# Patient Record
Sex: FEMALE | Race: WHITE | NOT HISPANIC OR LATINO | Employment: UNEMPLOYED | URBAN - METROPOLITAN AREA
[De-identification: names, ages, dates, MRNs, and addresses within clinical notes are randomized per-mention and may not be internally consistent; named-entity substitution may affect disease eponyms.]

---

## 2023-10-16 ENCOUNTER — OFFICE VISIT (OUTPATIENT)
Dept: OPHTHALMOLOGY | Facility: CLINIC | Age: 43
End: 2023-10-16
Payer: COMMERCIAL

## 2023-10-16 DIAGNOSIS — H10.13 ALLERGIC CONJUNCTIVITIS OF BOTH EYES: Primary | ICD-10-CM

## 2023-10-16 PROCEDURE — 99202 OFFICE O/P NEW SF 15 MIN: CPT | Performed by: OPHTHALMOLOGY

## 2023-10-16 ASSESSMENT — TONOMETRY
IOP_METHOD: TONOPEN
OS_IOP_MMHG: 16
OD_IOP_MMHG: 14

## 2023-10-16 ASSESSMENT — SLIT LAMP EXAM - LIDS
COMMENTS: NORMAL
COMMENTS: NORMAL

## 2023-10-16 ASSESSMENT — CUP TO DISC RATIO
OD_RATIO: 0.2
OS_RATIO: 0.2

## 2023-10-16 ASSESSMENT — EXTERNAL EXAM - RIGHT EYE: OD_EXAM: NORMAL

## 2023-10-16 ASSESSMENT — VISUAL ACUITY
CORRECTION_TYPE: GLASSES
METHOD: SNELLEN - LINEAR
OD_CC: 20/20
OS_CC: 20/20

## 2023-10-16 ASSESSMENT — EXTERNAL EXAM - LEFT EYE: OS_EXAM: NORMAL

## 2023-10-16 NOTE — PROGRESS NOTES
Assessment/Plan   Diagnoses and all orders for this visit:  Allergic conjunctivitis of both eyes  Resume pataday  Can use tobradex pulse for breakthru symptoms  Decrease contact lens (CL) wearing time

## 2023-10-30 ENCOUNTER — PREP FOR PROCEDURE (OUTPATIENT)
Dept: ORTHOPEDIC SURGERY | Facility: HOSPITAL | Age: 43
End: 2023-10-30
Payer: COMMERCIAL

## 2023-10-30 DIAGNOSIS — M24.676 ANKYLOSIS OF SUBTALAR JOINT: Primary | ICD-10-CM

## 2023-10-30 DIAGNOSIS — M25.872 ANKLE IMPINGEMENT SYNDROME, LEFT: ICD-10-CM

## 2023-10-30 DIAGNOSIS — M76.72 PERONEAL TENDINITIS OF LEFT LOWER EXTREMITY: ICD-10-CM

## 2023-10-30 DIAGNOSIS — M95.8 OSTEOCHONDRAL DEFECT OF TALUS: ICD-10-CM

## 2023-10-31 PROBLEM — M24.676: Status: ACTIVE | Noted: 2023-10-30

## 2023-10-31 PROBLEM — M25.872 ANKLE IMPINGEMENT SYNDROME, LEFT: Status: ACTIVE | Noted: 2023-10-30

## 2023-10-31 PROBLEM — M95.8 OSTEOCHONDRAL DEFECT OF TALUS: Status: ACTIVE | Noted: 2023-10-30

## 2023-10-31 PROBLEM — M76.72 PERONEAL TENDINITIS OF LEFT LOWER EXTREMITY: Status: ACTIVE | Noted: 2023-10-30

## 2023-10-31 RX ORDER — SODIUM CHLORIDE 9 MG/ML
100 INJECTION, SOLUTION INTRAVENOUS CONTINUOUS
Status: CANCELLED | OUTPATIENT
Start: 2023-11-16

## 2023-11-03 PROBLEM — J32.2 CHRONIC POSTERIOR ETHMOIDAL SINUSITIS: Status: ACTIVE | Noted: 2023-11-03

## 2023-11-03 PROBLEM — J34.2 DNS (DEVIATED NASAL SEPTUM): Status: ACTIVE | Noted: 2023-11-03

## 2023-11-03 PROBLEM — G57.61 MORTON'S NEUROMA OF RIGHT FOOT: Status: ACTIVE | Noted: 2023-11-03

## 2023-11-03 PROBLEM — M20.11 HALLUX VALGUS WITH BUNIONS OF RIGHT FOOT: Status: ACTIVE | Noted: 2023-11-03

## 2023-11-03 PROBLEM — M21.611 HALLUX VALGUS WITH BUNIONS OF RIGHT FOOT: Status: ACTIVE | Noted: 2023-11-03

## 2023-11-03 PROBLEM — H69.91 DYSFUNCTION OF RIGHT EUSTACHIAN TUBE: Status: ACTIVE | Noted: 2023-11-03

## 2023-11-03 PROBLEM — F41.9 ANXIETY: Status: ACTIVE | Noted: 2021-08-11

## 2023-11-03 RX ORDER — LEVALBUTEROL 1.25 MG/.5ML
SOLUTION, CONCENTRATE RESPIRATORY (INHALATION)
COMMUNITY
Start: 2017-11-30

## 2023-11-03 RX ORDER — MAGNESIUM 250 MG
250 TABLET ORAL
COMMUNITY

## 2023-11-03 RX ORDER — DULOXETINE 40 MG/1
50 CAPSULE, DELAYED RELEASE ORAL DAILY
COMMUNITY
Start: 2023-10-04

## 2023-11-03 RX ORDER — PREDNISONE 20 MG/1
1 TABLET ORAL 2 TIMES DAILY
Status: ON HOLD | COMMUNITY
Start: 2017-11-30 | End: 2023-11-16 | Stop reason: ALTCHOICE

## 2023-11-03 RX ORDER — LEVOTHYROXINE SODIUM 50 UG/1
50 TABLET ORAL
COMMUNITY
Start: 2017-03-22

## 2023-11-03 RX ORDER — LEVALBUTEROL TARTRATE 45 UG/1
2 AEROSOL, METERED ORAL EVERY 4 HOURS PRN
COMMUNITY
Start: 2017-11-29

## 2023-11-03 RX ORDER — TALC
3 POWDER (GRAM) TOPICAL NIGHTLY
COMMUNITY

## 2023-11-03 RX ORDER — CLOBETASOL PROPIONATE 0.5 MG/G
CREAM TOPICAL
COMMUNITY
Start: 2023-10-16

## 2023-11-03 RX ORDER — ALBUTEROL SULFATE 90 UG/1
2 AEROSOL, METERED RESPIRATORY (INHALATION) EVERY 6 HOURS PRN
Status: ON HOLD | COMMUNITY
End: 2023-11-16 | Stop reason: ALTCHOICE

## 2023-11-03 RX ORDER — MOMETASONE FUROATE 220 UG/1
1 INHALANT RESPIRATORY (INHALATION)
Status: ON HOLD | COMMUNITY
Start: 2019-02-26 | End: 2023-11-16 | Stop reason: ALTCHOICE

## 2023-11-03 RX ORDER — SILVER SULFADIAZINE 10 G/1000G
CREAM TOPICAL
Status: ON HOLD | COMMUNITY
Start: 2018-01-27 | End: 2023-11-16 | Stop reason: ALTCHOICE

## 2023-11-03 RX ORDER — FLUNISOLIDE 0.25 MG/ML
2 SOLUTION NASAL 2 TIMES DAILY
COMMUNITY
Start: 2018-03-06

## 2023-11-03 RX ORDER — ALPRAZOLAM 0.25 MG/1
0.25 TABLET ORAL
COMMUNITY

## 2023-11-06 ENCOUNTER — OFFICE VISIT (OUTPATIENT)
Dept: ORTHOPEDIC SURGERY | Facility: HOSPITAL | Age: 43
End: 2023-11-06
Payer: COMMERCIAL

## 2023-11-06 DIAGNOSIS — M25.872 ANKLE IMPINGEMENT SYNDROME, LEFT: Primary | ICD-10-CM

## 2023-11-06 DIAGNOSIS — M24.676 ANKYLOSIS OF SUBTALAR JOINT: ICD-10-CM

## 2023-11-06 DIAGNOSIS — M95.8 OSTEOCHONDRAL DEFECT OF TALUS: ICD-10-CM

## 2023-11-06 DIAGNOSIS — M76.72 PERONEAL TENDINITIS OF LEFT LOWER EXTREMITY: ICD-10-CM

## 2023-11-06 PROCEDURE — 99213 OFFICE O/P EST LOW 20 MIN: CPT | Performed by: ORTHOPAEDIC SURGERY

## 2023-11-06 PROCEDURE — 1036F TOBACCO NON-USER: CPT | Performed by: ORTHOPAEDIC SURGERY

## 2023-11-15 ENCOUNTER — PREP FOR PROCEDURE (OUTPATIENT)
Dept: ORTHOPEDIC SURGERY | Facility: HOSPITAL | Age: 43
End: 2023-11-15
Payer: COMMERCIAL

## 2023-11-16 ENCOUNTER — ANESTHESIA (OUTPATIENT)
Dept: OPERATING ROOM | Facility: CLINIC | Age: 43
End: 2023-11-16
Payer: COMMERCIAL

## 2023-11-16 ENCOUNTER — ANESTHESIA EVENT (OUTPATIENT)
Dept: OPERATING ROOM | Facility: CLINIC | Age: 43
End: 2023-11-16
Payer: COMMERCIAL

## 2023-11-16 ENCOUNTER — HOSPITAL ENCOUNTER (OUTPATIENT)
Facility: CLINIC | Age: 43
Setting detail: OUTPATIENT SURGERY
Discharge: HOME | End: 2023-11-16
Attending: ORTHOPAEDIC SURGERY | Admitting: ORTHOPAEDIC SURGERY
Payer: COMMERCIAL

## 2023-11-16 VITALS
HEART RATE: 80 BPM | HEIGHT: 62 IN | BODY MASS INDEX: 25.36 KG/M2 | WEIGHT: 137.79 LBS | DIASTOLIC BLOOD PRESSURE: 61 MMHG | TEMPERATURE: 96.8 F | SYSTOLIC BLOOD PRESSURE: 103 MMHG | OXYGEN SATURATION: 99 % | RESPIRATION RATE: 16 BRPM

## 2023-11-16 DIAGNOSIS — M25.872 ANKLE IMPINGEMENT SYNDROME, LEFT: ICD-10-CM

## 2023-11-16 DIAGNOSIS — M76.72 PERONEAL TENDINITIS OF LEFT LOWER EXTREMITY: ICD-10-CM

## 2023-11-16 DIAGNOSIS — M95.8 OSTEOCHONDRAL DEFECT OF TALUS: ICD-10-CM

## 2023-11-16 DIAGNOSIS — M24.676 ANKYLOSIS OF SUBTALAR JOINT: Primary | ICD-10-CM

## 2023-11-16 PROCEDURE — 3700000002 HC GENERAL ANESTHESIA TIME - EACH INCREMENTAL 1 MINUTE: Performed by: ORTHOPAEDIC SURGERY

## 2023-11-16 PROCEDURE — 2500000004 HC RX 250 GENERAL PHARMACY W/ HCPCS (ALT 636 FOR OP/ED): Performed by: ORTHOPAEDIC SURGERY

## 2023-11-16 PROCEDURE — 94760 N-INVAS EAR/PLS OXIMETRY 1: CPT | Mod: CCI

## 2023-11-16 PROCEDURE — 29891 ARTHR ANK OSTCHN DF TAL&/TIB: CPT | Performed by: ORTHOPAEDIC SURGERY

## 2023-11-16 PROCEDURE — 94640 AIRWAY INHALATION TREATMENT: CPT | Mod: 59

## 2023-11-16 PROCEDURE — 64445 NJX AA&/STRD SCIATIC NRV IMG: CPT | Performed by: STUDENT IN AN ORGANIZED HEALTH CARE EDUCATION/TRAINING PROGRAM

## 2023-11-16 PROCEDURE — 7100000001 HC RECOVERY ROOM TIME - INITIAL BASE CHARGE: Performed by: ORTHOPAEDIC SURGERY

## 2023-11-16 PROCEDURE — A29891 PR ANKLE SCOPE,EXCIS OSTEOCHON DEFCT: Performed by: ANESTHESIOLOGY

## 2023-11-16 PROCEDURE — 2500000005 HC RX 250 GENERAL PHARMACY W/O HCPCS

## 2023-11-16 PROCEDURE — A29891 PR ANKLE SCOPE,EXCIS OSTEOCHON DEFCT

## 2023-11-16 PROCEDURE — 7100000002 HC RECOVERY ROOM TIME - EACH INCREMENTAL 1 MINUTE: Performed by: ORTHOPAEDIC SURGERY

## 2023-11-16 PROCEDURE — 2720000007 HC OR 272 NO HCPCS: Performed by: ORTHOPAEDIC SURGERY

## 2023-11-16 PROCEDURE — 2500000004 HC RX 250 GENERAL PHARMACY W/ HCPCS (ALT 636 FOR OP/ED): Performed by: STUDENT IN AN ORGANIZED HEALTH CARE EDUCATION/TRAINING PROGRAM

## 2023-11-16 PROCEDURE — 2500000001 HC RX 250 WO HCPCS SELF ADMINISTERED DRUGS (ALT 637 FOR MEDICARE OP)

## 2023-11-16 PROCEDURE — 3600000004 HC OR TIME - INITIAL BASE CHARGE - PROCEDURE LEVEL FOUR: Performed by: ORTHOPAEDIC SURGERY

## 2023-11-16 PROCEDURE — 3700000001 HC GENERAL ANESTHESIA TIME - INITIAL BASE CHARGE: Performed by: ORTHOPAEDIC SURGERY

## 2023-11-16 PROCEDURE — 2500000002 HC RX 250 W HCPCS SELF ADMINISTERED DRUGS (ALT 637 FOR MEDICARE OP, ALT 636 FOR OP/ED): Performed by: STUDENT IN AN ORGANIZED HEALTH CARE EDUCATION/TRAINING PROGRAM

## 2023-11-16 PROCEDURE — 3600000009 HC OR TIME - EACH INCREMENTAL 1 MINUTE - PROCEDURE LEVEL FOUR: Performed by: ORTHOPAEDIC SURGERY

## 2023-11-16 PROCEDURE — 7100000010 HC PHASE TWO TIME - EACH INCREMENTAL 1 MINUTE: Performed by: ORTHOPAEDIC SURGERY

## 2023-11-16 PROCEDURE — 7100000009 HC PHASE TWO TIME - INITIAL BASE CHARGE: Performed by: ORTHOPAEDIC SURGERY

## 2023-11-16 PROCEDURE — 76942 ECHO GUIDE FOR BIOPSY: CPT | Performed by: STUDENT IN AN ORGANIZED HEALTH CARE EDUCATION/TRAINING PROGRAM

## 2023-11-16 PROCEDURE — 29906 SUBTALAR ARTHRO W/DEB: CPT | Performed by: ORTHOPAEDIC SURGERY

## 2023-11-16 PROCEDURE — 2500000004 HC RX 250 GENERAL PHARMACY W/ HCPCS (ALT 636 FOR OP/ED)

## 2023-11-16 RX ORDER — FENTANYL CITRATE 50 UG/ML
25 INJECTION, SOLUTION INTRAMUSCULAR; INTRAVENOUS EVERY 5 MIN PRN
Status: DISCONTINUED | OUTPATIENT
Start: 2023-11-16 | End: 2023-11-16 | Stop reason: HOSPADM

## 2023-11-16 RX ORDER — GABAPENTIN 300 MG/1
CAPSULE ORAL AS NEEDED
Status: DISCONTINUED | OUTPATIENT
Start: 2023-11-16 | End: 2023-11-16

## 2023-11-16 RX ORDER — HYDROMORPHONE HYDROCHLORIDE 1 MG/ML
0.5 INJECTION, SOLUTION INTRAMUSCULAR; INTRAVENOUS; SUBCUTANEOUS EVERY 5 MIN PRN
Status: DISCONTINUED | OUTPATIENT
Start: 2023-11-16 | End: 2023-11-16 | Stop reason: HOSPADM

## 2023-11-16 RX ORDER — ONDANSETRON 4 MG/1
8 TABLET, FILM COATED ORAL EVERY 8 HOURS PRN
Qty: 20 TABLET | Refills: 0 | Status: SHIPPED | OUTPATIENT
Start: 2023-11-16 | End: 2023-11-28 | Stop reason: WASHOUT

## 2023-11-16 RX ORDER — PROPOFOL 10 MG/ML
INJECTION, EMULSION INTRAVENOUS CONTINUOUS PRN
Status: DISCONTINUED | OUTPATIENT
Start: 2023-11-16 | End: 2023-11-16

## 2023-11-16 RX ORDER — PHENYLEPHRINE HCL IN 0.9% NACL 0.4MG/10ML
SYRINGE (ML) INTRAVENOUS AS NEEDED
Status: DISCONTINUED | OUTPATIENT
Start: 2023-11-16 | End: 2023-11-16

## 2023-11-16 RX ORDER — SODIUM CHLORIDE, SODIUM LACTATE, POTASSIUM CHLORIDE, CALCIUM CHLORIDE 600; 310; 30; 20 MG/100ML; MG/100ML; MG/100ML; MG/100ML
100 INJECTION, SOLUTION INTRAVENOUS CONTINUOUS
Status: DISCONTINUED | OUTPATIENT
Start: 2023-11-16 | End: 2023-11-16 | Stop reason: HOSPADM

## 2023-11-16 RX ORDER — MIDAZOLAM HYDROCHLORIDE 1 MG/ML
INJECTION, SOLUTION INTRAMUSCULAR; INTRAVENOUS AS NEEDED
Status: DISCONTINUED | OUTPATIENT
Start: 2023-11-16 | End: 2023-11-16

## 2023-11-16 RX ORDER — PROPOFOL 10 MG/ML
INJECTION, EMULSION INTRAVENOUS AS NEEDED
Status: DISCONTINUED | OUTPATIENT
Start: 2023-11-16 | End: 2023-11-16

## 2023-11-16 RX ORDER — DOCUSATE SODIUM 100 MG/1
100 CAPSULE, LIQUID FILLED ORAL 2 TIMES DAILY
Qty: 60 CAPSULE | Refills: 0 | Status: SHIPPED | OUTPATIENT
Start: 2023-11-16 | End: 2023-11-28 | Stop reason: WASHOUT

## 2023-11-16 RX ORDER — OXYCODONE AND ACETAMINOPHEN 5; 325 MG/1; MG/1
1 TABLET ORAL EVERY 6 HOURS PRN
Qty: 12 TABLET | Refills: 0 | Status: SHIPPED | OUTPATIENT
Start: 2023-11-16 | End: 2023-11-28 | Stop reason: WASHOUT

## 2023-11-16 RX ORDER — LIDOCAINE HYDROCHLORIDE 10 MG/ML
0.1 INJECTION, SOLUTION EPIDURAL; INFILTRATION; INTRACAUDAL; PERINEURAL ONCE
Status: DISCONTINUED | OUTPATIENT
Start: 2023-11-16 | End: 2023-11-16 | Stop reason: HOSPADM

## 2023-11-16 RX ORDER — CEFAZOLIN SODIUM 2 G/100ML
2 INJECTION, SOLUTION INTRAVENOUS ONCE
Status: COMPLETED | OUTPATIENT
Start: 2023-11-16 | End: 2023-11-16

## 2023-11-16 RX ORDER — SODIUM CHLORIDE 9 MG/ML
100 INJECTION, SOLUTION INTRAVENOUS CONTINUOUS
Status: DISCONTINUED | OUTPATIENT
Start: 2023-11-16 | End: 2023-11-16 | Stop reason: HOSPADM

## 2023-11-16 RX ORDER — DEXAMETHASONE SODIUM PHOSPHATE 4 MG/ML
INJECTION, SOLUTION INTRA-ARTICULAR; INTRALESIONAL; INTRAMUSCULAR; INTRAVENOUS; SOFT TISSUE AS NEEDED
Status: DISCONTINUED | OUTPATIENT
Start: 2023-11-16 | End: 2023-11-16

## 2023-11-16 RX ORDER — ALBUTEROL SULFATE 0.83 MG/ML
2.5 SOLUTION RESPIRATORY (INHALATION) ONCE AS NEEDED
Status: COMPLETED | OUTPATIENT
Start: 2023-11-16 | End: 2023-11-16

## 2023-11-16 RX ORDER — FENTANYL CITRATE 50 UG/ML
INJECTION, SOLUTION INTRAMUSCULAR; INTRAVENOUS AS NEEDED
Status: DISCONTINUED | OUTPATIENT
Start: 2023-11-16 | End: 2023-11-16

## 2023-11-16 RX ORDER — OXYCODONE HYDROCHLORIDE 5 MG/1
5 TABLET ORAL EVERY 4 HOURS PRN
Status: DISCONTINUED | OUTPATIENT
Start: 2023-11-16 | End: 2023-11-16 | Stop reason: HOSPADM

## 2023-11-16 RX ORDER — LIDOCAINE HYDROCHLORIDE 20 MG/ML
INJECTION, SOLUTION INFILTRATION; PERINEURAL AS NEEDED
Status: DISCONTINUED | OUTPATIENT
Start: 2023-11-16 | End: 2023-11-16

## 2023-11-16 RX ORDER — ACETAMINOPHEN 325 MG/1
TABLET ORAL AS NEEDED
Status: DISCONTINUED | OUTPATIENT
Start: 2023-11-16 | End: 2023-11-16

## 2023-11-16 RX ORDER — NAPROXEN SODIUM 220 MG/1
81 TABLET, FILM COATED ORAL 2 TIMES DAILY
Qty: 60 TABLET | Refills: 0 | Status: SHIPPED | OUTPATIENT
Start: 2023-11-16 | End: 2023-12-16

## 2023-11-16 RX ORDER — MEPERIDINE HYDROCHLORIDE 25 MG/ML
12.5 INJECTION INTRAMUSCULAR; INTRAVENOUS; SUBCUTANEOUS EVERY 10 MIN PRN
Status: DISCONTINUED | OUTPATIENT
Start: 2023-11-16 | End: 2023-11-16 | Stop reason: HOSPADM

## 2023-11-16 RX ORDER — KETOROLAC TROMETHAMINE 30 MG/ML
INJECTION, SOLUTION INTRAMUSCULAR; INTRAVENOUS AS NEEDED
Status: DISCONTINUED | OUTPATIENT
Start: 2023-11-16 | End: 2023-11-16

## 2023-11-16 RX ORDER — HYDROMORPHONE HYDROCHLORIDE 1 MG/ML
INJECTION, SOLUTION INTRAMUSCULAR; INTRAVENOUS; SUBCUTANEOUS AS NEEDED
Status: DISCONTINUED | OUTPATIENT
Start: 2023-11-16 | End: 2023-11-16

## 2023-11-16 RX ORDER — ONDANSETRON HYDROCHLORIDE 2 MG/ML
INJECTION, SOLUTION INTRAVENOUS AS NEEDED
Status: DISCONTINUED | OUTPATIENT
Start: 2023-11-16 | End: 2023-11-16

## 2023-11-16 RX ORDER — METOCLOPRAMIDE HYDROCHLORIDE 5 MG/ML
10 INJECTION INTRAMUSCULAR; INTRAVENOUS ONCE AS NEEDED
Status: DISCONTINUED | OUTPATIENT
Start: 2023-11-16 | End: 2023-11-16 | Stop reason: HOSPADM

## 2023-11-16 RX ORDER — LABETALOL HYDROCHLORIDE 5 MG/ML
5 INJECTION, SOLUTION INTRAVENOUS ONCE AS NEEDED
Status: DISCONTINUED | OUTPATIENT
Start: 2023-11-16 | End: 2023-11-16 | Stop reason: HOSPADM

## 2023-11-16 RX ADMIN — Medication 100 MCG: at 13:48

## 2023-11-16 RX ADMIN — FENTANYL CITRATE 25 MCG: 50 INJECTION, SOLUTION INTRAMUSCULAR; INTRAVENOUS at 12:21

## 2023-11-16 RX ADMIN — MIDAZOLAM 2 MG: 1 INJECTION INTRAMUSCULAR; INTRAVENOUS at 12:20

## 2023-11-16 RX ADMIN — PROPOFOL 200 MG: 10 INJECTION, EMULSION INTRAVENOUS at 12:37

## 2023-11-16 RX ADMIN — SODIUM CHLORIDE, SODIUM LACTATE, POTASSIUM CHLORIDE, AND CALCIUM CHLORIDE: .6; .31; .03; .02 INJECTION, SOLUTION INTRAVENOUS at 12:31

## 2023-11-16 RX ADMIN — HYDROMORPHONE HYDROCHLORIDE 0.25 MG: 1 INJECTION, SOLUTION INTRAMUSCULAR; INTRAVENOUS; SUBCUTANEOUS at 14:26

## 2023-11-16 RX ADMIN — Medication 100 MCG: at 13:23

## 2023-11-16 RX ADMIN — FENTANYL CITRATE 50 MCG: 50 INJECTION, SOLUTION INTRAMUSCULAR; INTRAVENOUS at 12:22

## 2023-11-16 RX ADMIN — ONDANSETRON 4 MG: 2 INJECTION INTRAMUSCULAR; INTRAVENOUS at 14:24

## 2023-11-16 RX ADMIN — Medication 100 MCG: at 14:33

## 2023-11-16 RX ADMIN — KETOROLAC TROMETHAMINE 30 MG: 30 INJECTION, SOLUTION INTRAMUSCULAR at 14:24

## 2023-11-16 RX ADMIN — ACETAMINOPHEN 975 MG: 325 TABLET ORAL at 12:15

## 2023-11-16 RX ADMIN — ALBUTEROL SULFATE 2.5 MG: 2.5 SOLUTION RESPIRATORY (INHALATION) at 14:50

## 2023-11-16 RX ADMIN — LIDOCAINE HYDROCHLORIDE 100 MG: 20 INJECTION, SOLUTION INFILTRATION; PERINEURAL at 14:41

## 2023-11-16 RX ADMIN — CEFAZOLIN SODIUM 2 G: 2 INJECTION, SOLUTION INTRAVENOUS at 12:43

## 2023-11-16 RX ADMIN — Medication 100 MCG: at 13:36

## 2023-11-16 RX ADMIN — FENTANYL CITRATE 25 MCG: 50 INJECTION, SOLUTION INTRAMUSCULAR; INTRAVENOUS at 14:24

## 2023-11-16 RX ADMIN — PROPOFOL 60 MCG/KG/MIN: 10 INJECTION, EMULSION INTRAVENOUS at 12:42

## 2023-11-16 RX ADMIN — LIDOCAINE HYDROCHLORIDE 80 MG: 20 INJECTION, SOLUTION INFILTRATION; PERINEURAL at 12:37

## 2023-11-16 RX ADMIN — DEXAMETHASONE SODIUM PHOSPHATE 4 MG: 4 INJECTION, SOLUTION INTRAMUSCULAR; INTRAVENOUS at 12:46

## 2023-11-16 RX ADMIN — GABAPENTIN 300 MG: 300 CAPSULE ORAL at 12:15

## 2023-11-16 SDOH — HEALTH STABILITY: MENTAL HEALTH: CURRENT SMOKER: 0

## 2023-11-16 ASSESSMENT — PAIN SCALES - GENERAL
PAINLEVEL_OUTOF10: 0 - NO PAIN
PAINLEVEL_OUTOF10: 0 - NO PAIN
PAINLEVEL_OUTOF10: 2
PAINLEVEL_OUTOF10: 0 - NO PAIN
PAINLEVEL_OUTOF10: 0 - NO PAIN

## 2023-11-16 ASSESSMENT — PAIN - FUNCTIONAL ASSESSMENT
PAIN_FUNCTIONAL_ASSESSMENT: 0-10

## 2023-11-16 ASSESSMENT — PAIN DESCRIPTION - DESCRIPTORS: DESCRIPTORS: ACHING

## 2023-11-16 NOTE — ANESTHESIA PROCEDURE NOTES
Airway  Date/Time: 11/16/2023 12:38 PM  Urgency: elective    Airway not difficult    Staffing  Performed: LIONEL   Authorized by: Celestine Baeza DO    Performed by: RICHARD Chiu  Patient location during procedure: OR    Indications and Patient Condition  Indications for airway management: anesthesia and airway protection  Spontaneous Ventilation: absent  Sedation level: deep  Preoxygenated: yes  Patient position: sniffing  Mask difficulty assessment: 1 - vent by mask  Planned trial extubation    Final Airway Details  Final airway type: supraglottic airway      Successful airway: Size 4     Number of attempts at approach: 1    Additional Comments  Lips/teeth in preanesthetic condition. LMA IGEL 4.

## 2023-11-16 NOTE — OP NOTE
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] Post-op Diagnosis     * Ankle impingement syndrome, left [M25.872] spurs and soft tissue     * Osteochondral defect of talus [M95.8] medial 8 (A-P) and 6 (M-L), OCD central and lateral tibia with intact cartilage cap     * Peroneal tendinitis of left lower extremity [M76.72] with no tear     No ankylosis subtalar joint [M24.676]     Procedures  Left ankle arthroscopy with drilling of OCD talus, subtalar joint arthroscopy with debridement, peroneal tendinoscopy  51157 - RI ARTHROSCOPY ANKLE SURGICAL DEBRIDEMENT EXTENSIVE    RI ARTHRS ANKLE EXC OSTCHNDRL DFCT W/DRLG DFCT [96395]  RI ARTHROSCOPY SUBTALAR JOINT WITH DEBRIDEMENT [95940]  Surgeons      * Emma Gu - Primary    Resident/Fellow/Other Assistant:  Surgeon(s) and Role:     * Emerson Fall DO - Resident - Assisting    Procedure Summary  Anesthesia: Regional  ASA: II  Anesthesia Staff: Anesthesiologist: Michael King MD; Celestine Baeza DO  C-AA: RICHARD Chiu  Estimated Blood Loss: 5mL  Intra-op Medications:   Medication Name Total Dose   albuterol 2.5 mg /3 mL (0.083 %) nebulizer solution 2.5 mg 2.5 mg   ceFAZolin in dextrose (iso-os) (Ancef) IVPB 2 g 2 g              Anesthesia Record               Intraprocedure I/O Totals          Intake    .00 mL    Propofol Drip 55.88 mL    The total shown is the total volume documented since Anesthesia Start was filed.    ceFAZolin in  dextrose (iso-os) (Ancef) IVPB 2 g 100.00 mL    Total Intake 955.88 mL       Output    Est. Blood Loss 2 mL    Total Output 2 mL       Net    Net Volume 953.88 mL          Specimen: No specimens collected     Staff:   Circulator: Huang Gonsalves RN  Relief Scrub: Viri Curtis  Scrub Person: Nita Crocker         Drains and/or Catheters: * None in log *    Tourniquet Times:     Total Tourniquet Time Documented:  Thigh (Left) - 96 minutes  Total: Thigh (Left) - 96 minutes      Indications: Sai Scanlon is an 43 y.o. female who is having surgery for Ankle impingement syndrome, left [M25.872]  Osteochondral defect of talus [M95.8]  Peroneal tendinitis of left lower extremity [M76.72]  Possible ankylosis of subtalar joint [M24.676].  She has had since symptoms recalcitrant to conservative treatment.  Imaging revealed osteochondral defect of the talus and also of the tibia along with other findings as discussed.  After discussing the alternatives of treatment in detail with the patient, the patient selected surgical intervention and/or reconstruction.  We discussed with the patient risks and benefits of the procedure(s).  Risks of surgery were reviewed including pain, bleeding, infection, wound healing problems, soft tissue or bone healing problems, injury to nerves or vessels, implant or hardware related complications, chronic extremity stiffness or pain or swelling, post-traumatic arthritis, recurrent symptoms, DVT, PE and other medical complications.  After the patient´s questions were answered in detail including post-operative course requiring nonweightbearing if microfracture or subchondral drilling of the osteochondral defect was needed and the extensive rehabilitation needed after surgery, the patient then gave informed consent for the procedure.    DESCRIPTION OF PROCEDURE  The patient was identified in the pre-operative area and the surgical extremity was marked with a skin marker to designate surgical  site.  Anesthesia consult placed popliteal and saphenous nerve block without complication.  Patient then was brought back to the operating room.  A huddle was called and confirmed upon entry into the operating room as per protocol.  Time out was called and confirmed prior to skin incision.  General anesthetic was administered and LMA placed without complication.  Patient received IV Ancef prior to skin incision as per protocol.  DVT prophylaxis was performed using stocking and SCD application on well leg.  A well-padded tourniquet was placed on the left upper thigh and was elevated to 280mmHg for 96 minutes during the case.  The patient then was carefully positioned on the beanbag with bump underneath the ipsilateral hip to help with ankle access during the case.  All superficial nerve areas and bony prominences were well-padded and protected during the case and neutral spinal alignment maintained.  The operative leg was placed in a well-padded Ferkel leg ocasio to protect the neurovascular structures.  We then proceeded to prep and drape in the usual standard sterile fashion.    Left ankle arthroscopy with subchondral drilling of osteochondral defect of the talus : the sterile ankle stirrup and distractor system was gently applied to the operative ankle.  Anatomical landmarks of the left ankle were then identified and anteromedial portal localized with an 18 gauge spinal needle.  A small nick in the skin was made with an 11 blade and portal was then made with a mosquito hemostat.  We then introduced the 2.7mm 30 degree arthroscope into the ankle joint.  Looking anterolaterally, we then localized our anterolateral portal under direct visualization with an 18 gauge spinal needle.  Denis and spread technique was then used to create the anterolateral portal.  We then performed diagnostic arthroscopy which revealed large loose osteochondral defect with cartilage flap over the central aspect of the medial talus, intact  cartilage But osteochondral defect of the central lateral tibia, large meniscoid hypertrophic soft tissue lesion in the syndesmosis, synovitis with plical thickening in the medial lateral gutters, small anterior medial talar spur and medial malleolar distal tip spur, mild chondromalacia of the anterior aspect of the talar dome.  We then proceeded with extensive debridement of the hypertrophic synovial tissue and plical formation with oscillating shaver.  We then removed the spurs with shaver plus bur that were noted on the medial malleolus and anterior medial talus.  We then utilized ring curettes and curved curettes to remove the osteochondral fragment that was only partially attached anteriorly.  We then made a stable rim of cartilage that was healthy with a series of curettes and also by switching portals to gain access to different parts of the lesion.  Osteochondral defect was 8 mm anterior to posterior and 6 mm medial to lateral.  Bone quality was excellent.  We then proceeded with microfracture using awls.  After microfracture completed carefully we noted appropriate bleeding at the osteochondral defect site when the water was turned off and suction applied.  We also hyperdorsiflexed the ankle to confirm that there would not be any further bony or soft tissue impingement.  We then proceeded with copious irrigation.  We then removed our arthroscopy equipment.      Left subtalar joint arthroscopy with debridement: We utilized the anterior lateral portal to gain access the subtalar joint.  We utilized a 18-gauge spinal needle and used nick and spread technique.  We then placed the 2.0 30 degree arthroscope into the subtalar joint.  We localized the posterior lateral portal with spinal needle.  Mild synovitis noted and removed.  Articular surface of the subtalar joint looks excellent.  There were no adhesions in the subtalar joints and excellent motion was confirmed.  No osteochondral defects.  We then proceeded  with copious irrigation and then removed the arthroscopy equipment.    Left peroneal tendoscopy: We removed the ankle stirrup and ankle distractor system.  Ankle was placed on a sterile bump.  We then identified the distal tip of the fibula and localized our inferior portal for peroneal tendoscopy.  Portal was made in the distal aspect of the sheath inferior to the distal tip of the fibula but well anterior to the CFL.  Localization with a 18-gauge spinal needle was done first and then a small nick in the skin was made with 11 blade.  We then utilized a mosquito hemostat and bluntly spread to enter into the sheath.  We then placed the 2.7 30 degree arthroscope into the inferior sheath and peroneal tendon region.  We then localized the proximal portal under direct visualization with a spinal needle.  Denis and spread technique was done to create the proximal portal that was approximately 3 cm proximal to the distal tip of the fibula.  We are then able to use both portals and we systematically evaluated the peroneus longus and brevis from proximal to distal.  We were able to circumferentially evaluate both tendons and no tears were found.  No significant tenosynovitis.  Low-lying muscle belly helped us identify the peroneus brevis along with direct visualization of the brevis traversing the distal fibular groove.  Superior peroneal retinaculum was intact.  Dynamic testing of the peroneal tendons was performed and stability noted.  We switched portals placing the scope in the proximal portal and the probe in the inferior portal and did a circumferential examination again of both the peroneus longus and peroneus brevis with no tears being found.  We then copiously irrigated the sheath and then removed the arthroscopy equipment.    After copious irrigation, all portals from the arthroscopy procedures were closed with 4-0 nylon. Tourniquet was released and all toes were pink and warm with distal pulses intact.  Dressing  including xeroform, fluffs, ABD´s, soft roll and well-padded posterior and sugar-tong splint was applied.  Dressing completed with ace wraps.      The patient was transported to the PACU in stable condition.  Patient will be non-weightbearing on their operative ankle with crutches, knee walker or wheelchair.  Patient fulfilled post-procedure discharge criteria and was released home.  Patient and patient representatives were given detailed discharge instructions and home-going medications including Percocet for severe pain only, Zofran, Senna and DVT prophylaxis with enteric-coated aspirin after consulting with pharmacy.  We discussed with the patient the different medications available for DVT prophylaxis and after discussion of risks and benefits the patient selected the above.  Splint care reviewed and splint will be kept in place until follow-up in the office in 7-10 days.  Findings were discussed with the patient and their representative after the procedure and questions were answered in detail.      Complications:  None; patient tolerated the procedure well.    Disposition: PACU - hemodynamically stable.  Condition: stable     Attending Attestation: I was present and scrubbed for the entire procedure.    Emma Gu  Phone Number: [phone]

## 2023-11-16 NOTE — ANESTHESIA PROCEDURE NOTES
Peripheral Block    Patient location during procedure: pre-op  Start time: 11/16/2023 12:20 PM  End time: 11/16/2023 12:24 PM  Reason for block: at surgeon's request and post-op pain management  Staffing  Performed: attending   Authorized by: Celestine Baeza DO    Performed by: Celestine Baeza DO  Preanesthetic Checklist  Completed: patient identified, IV checked, site marked, risks and benefits discussed, surgical consent, monitors and equipment checked, pre-op evaluation and timeout performed   Timeout performed at: 11/16/2023 12:19 PM  Peripheral Block  Patient position: laying flat  Prep: ChloraPrep  Patient monitoring: heart rate, cardiac monitor and continuous pulse ox  Block type: adductor canal  Injection technique: single-shot  Guidance: ultrasound guided  Local infiltration: ropivacaine  Infiltration strength: 0.5 %  Dose: 5 mL  Needle  Needle gauge: 22 G  Needle length: 8 cm  Needle localization: ultrasound guidance  Assessment  Injection assessment: negative aspiration for heme, no paresthesia on injection, incremental injection and local visualized surrounding nerve on ultrasound  Heart rate change: no  Slow fractionated injection: yes  Additional Notes  Patient was placed in supine position and skin prep was applied and allowed to dry over the noted laterality above. A high frequency linear ultrasound probe was placed over the anterior/medial thigh with corresponding anatomical and vascular structures noted. 5 ml of 2% lidocaine was used for topicalization. An echogenic needle was placed with in-line visualization via ultrasound and 15 ml of 0.5% ropivacaine was injected in the adductor canal. All local anesthestic was administered in incremental doses with negative aspiration noted between. Vital signs remained stable and no paresthesias were noted throughout the procedure.

## 2023-11-16 NOTE — BRIEF OP NOTE
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]     Procedures  Left Arthroscopy Ankle with drilling of OCD talus, subtalar joint arthroscopy with debridement, peroneal tendinoscopy  01697 - MS ARTHROSCOPY ANKLE SURGICAL DEBRIDEMENT EXTENSIVE    MS ARTHRS ANKLE EXC OSTCHNDRL DFCT W/DRLG DFCT [[id number]]  MS ARTHROSCOPY SUBTALAR JOINT WITH DEBRIDEMENT [35989]  Surgeons      * Emma Gu - Primary    Resident/Fellow/Other Assistant:  Surgeon(s) and Role:     * Emerson Fall DO - Resident - Assisting    Procedure Summary  Anesthesia: Regional  ASA: II  Anesthesia Staff: Anesthesiologist: Michael King MD; Celestine Baeza DO  C-AA: RICHARD Chiu  Estimated Blood Loss: 3mL  Intra-op Medications:   Medication Name Total Dose   ceFAZolin in dextrose (iso-os) (Ancef) IVPB 2 g 2 g              Anesthesia Record               Intraprocedure I/O Totals          Intake    Propofol Drip 0.00 mL    The total shown is the total volume documented since Anesthesia Start was filed.    Total Intake 0 mL          Specimen: No specimens collected     Staff:   Circulator: Huang Gonsalves RN  Relief Scrub: Viri Curtis  Scrub Person: Nita Crocker          Findings: see post surgical/procedural note     Complications:  None; patient tolerated the procedure well.     Disposition: PACU - hemodynamically stable.  Condition: stable  Specimens Collected: No specimens collected  Attending Attestation: I  was present and scrubbed for the entire procedure.    Emma Gu  Phone Number: [phone]

## 2023-11-16 NOTE — DISCHARGE INSTRUCTIONS
Follow-Up Instructions:    You will need to be seen in clinic by Dr. Gu in 1-2 week(s) time, for a post-operative evaluation.  Please call to confirm or schedule a visit. The location will depend on your availability.     If plain film imaging is needed at your next appointment, you will be instructed to go to radiology at that time, and the proper films will be obtained.    You will need to call and schedule an appointment, unless there is a previous appointment that appears on your discharge instructions.  The direct orthopaedic clinic appointment line phone number is [phone].  Please do not delay in calling to make this appointment.    Wound Care:    1) maintain your splint at all times on the left lower extremity.  Keep your splint clean, dry, and intact.  Continue to elevate the leg to help decrease swelling and pain.  If the saturated or falls off please call the office for further assistance.  2.  You may shower but you need to keep the splint clean    Activity:    Weight Bearing Status: Bearing in the left lower extremity with the use of an assistive device as your choice  Continue nonweightbearing status until cleared by physician    Discharge Medications:    You have been sent home with the following home pain medications: Percocet.  Please wean yourself off the Percocet, as tolerated.  You may not take more than 6 Percocet tablets in a single 24-hr period  You have been sent home with colace to prevent constipation while on narcotic pain medications.  Take as needed.    You have been sent home with Aspirin to prevent the formation of blood clots.  Take as directed. Follow-Up Instructions: Please take aspirin 2 times a day for 14 days to help with DVT prophylaxis.  If it needs to be continued this will be discussed at her next follow-up visit    May have Tylenol after: 6:15pm    May have Ibuprofen/advil/motrin/aleve after: 8:30pm    TO REACH YOUR PHYSICIAN AFTER HOURS CALL  AND ASK  FOR THE PHYSICIAN ON CALL

## 2023-11-16 NOTE — ANESTHESIA PREPROCEDURE EVALUATION
Patient: Sai Scanlon    Procedure Information       Date/Time: 11/16/23 1150    Procedure: Arthroscopy Ankle with drilling of OCD talus with possible biocartilage graft, subtalar joint arthroscopy with debridement, peroneal tendinoscopy with possible peroneal tendon repair (Left: Ankle)    Location: [facility] [place] [facility] OR 02 / [facility]    Surgeons: Emma Gu MD          Vitals:    11/16/23 1011   BP: 119/69   Pulse: 69   Resp: 16   Temp: 36.1 °C (97 °F)   SpO2: 98%       History reviewed. No pertinent surgical history.  Past Medical History:   Diagnosis Date    Anxiety     Asthma     Hypothyroidism     Migraine     Personal history of other diseases of the respiratory system 11/30/2017    History of asthma       Current Facility-Administered Medications:     ceFAZolin in dextrose (iso-os) (Ancef) IVPB 2 g, 2 g, intravenous, Once, Emma Gu MD    sodium chloride 0.9% infusion, 100 mL/hr, intravenous, Continuous, Emma Gu MD  Prior to Admission medications    Medication Sig Start Date End Date Taking? Authorizing Provider   BIOTIN, BULK, MISC    Yes Historical Provider, MD   clobetasol (Temovate) 0.05 % cream APPLY EXTERNALLY TO THE AFFECTED AREAS TWICE DAILY ON MONDAY TO FRIDAY FOR 1 TO 2 WEEKS AS NEEDED . TAKE BREAKS INBETWEEN 10/16/23  Yes Historical Provider, MD   DULoxetine 40 mg DR capsule Take 50 mg by mouth once daily. 10/4/23  Yes Historical Provider, MD   flunisolide (Nasalide) 25 mcg (0.025 %) spray,non-aerosol Administer 2 sprays into affected nostril(s) twice a day. 3/6/18  Yes Historical Provider, MD   levalbuterol (Xopenex) 1.25 mg/0.5 mL nebulizer solution Inhale. 11/30/17  Yes Historical Provider, MD   levalbuterol (Xopenex) 45 mcg/actuation inhaler Inhale 2 puffs every 4 hours if needed. 11/29/17  Yes Historical Provider, MD   levothyroxine (Synthroid, Levoxyl) 50 mcg tablet Take 1 tablet (50 mcg) by mouth once daily in the morning. Take before meals. 3/22/17  Yes  "Historical Provider, MD   magnesium 250 mg tablet Take 1 tablet (250 mg) by mouth once daily.   Yes Historical Provider, MD   melatonin 3 mg tablet Take 1 tablet (3 mg) by mouth once daily at bedtime.   Yes Historical Provider, MD   albuterol (ProAir HFA) 90 mcg/actuation inhaler Inhale 2 puffs every 6 hours if needed.  11/16/23 Yes Historical Provider, MD   ALPRAZolam (Xanax) 0.25 mg tablet 1 tablet (0.25 mg).    Historical Provider, MD   mometasone (Asmanex Twisthaler) 220 mcg/ actuation (14) inhaler Inhale 1 puff 2 times a day. 2/26/19 11/16/23  Historical Provider, MD   predniSONE (Deltasone) 20 mg tablet Take 1 tablet (20 mg) by mouth 2 times a day. 11/30/17 11/16/23  Historical Provider, MD   silver sulfADIAZINE (SSD) 1 % cream SSD 1 % External Cream   Quantity: 50  Refills: 0        Start : 27-Jan-2018   Active 1/27/18 11/16/23  Historical Provider, MD     Allergies   Allergen Reactions    Levofloxacin Other     Social History     Tobacco Use    Smoking status: Former     Types: Cigarettes    Smokeless tobacco: Never   Substance Use Topics    Alcohol use: Yes     Alcohol/week: 4.0 standard drinks of alcohol     Types: 2 Glasses of wine, 2 Shots of liquor per week         Chemistry    No results found for: \"NA\", \"K\", \"CL\", \"CO2\", \"BUN\", \"CREATININE\", \"GLU\" No results found for: \"CALCIUM\", \"ALKPHOS\", \"AST\", \"ALT\", \"BILITOT\"       No results found for: \"WBC\", \"HGB\", \"HCT\", \"PLT\"  No results found for: \"PROTIME\", \"PTT\", \"INR\"  No results found for this or any previous visit (from the past 4464 hour(s)).     Relevant Problems   Endocrine   (+) Hypothyroidism      Neuro/Psych   (+) Anxiety   (+) Cubital tunnel syndrome   (+) Perera's neuroma of right foot       Clinical information reviewed:   Tobacco  Allergies  Meds   Med Hx  Surg Hx  OB Status  Fam Hx  Soc   Hx        NPO Detail:  NPO/Void Status  Date of Last Liquid: 11/15/23  Time of Last Liquid: 2200  Date of Last Solid: 11/15/23  Time of Last Solid: " 2200  Time of Last Void: 1015         Physical Exam    Airway  Mallampati: II     Cardiovascular - normal exam  Rhythm: regular  Rate: normal     Dental - normal exam     Pulmonary - normal exam     Abdominal - normal exam  Abdomen: soft             Anesthesia Plan    ASA 2     general and regional     The patient is not a current smoker.  Patient was previously instructed to abstain from smoking on day of procedure.  Patient did not smoke on day of procedure.  Education provided regarding risk of obstructive sleep apnea.  intravenous induction   Postoperative administration of opioids is intended.  Anesthetic plan and risks discussed with patient.  Use of blood products discussed with patient who consented to blood products.    Plan discussed with CRNA.

## 2023-11-16 NOTE — H&P
Reason for Surgery: persistent ankle pain secondary to osteochondral defect of talus, impingement of left ankle and peroneal tendinitis of left lower extremity   Planned Procedure: arthroscopy ankle with drilling of OCD talus with possible biocartilage graft, subtalar joint arthroscopy with debridement, peroneal tendinoscopy with possible peroneal tendon repair     History & Physical Reviewed:  I have reviewed the History and Physical for obtained within the last 30 days. Relevant findings and updates are noted below:  No significant changes.    Home medications were reviewed with significant updates noted below:  No significant changes.    ERAS patient?: No    COVID-19 Risk Consent:   Surgeon has reviewed the key risks related to karthik COVID-19 and subsequent sequelae.     11/16/23 at 6:23 AM - Emerson Fall DO

## 2023-11-16 NOTE — ANESTHESIA PROCEDURE NOTES
Peripheral Block    Patient location during procedure: pre-op  Start time: 11/16/2023 12:25 PM  End time: 11/16/2023 12:27 PM  Reason for block: at surgeon's request and post-op pain management  Staffing  Performed: attending   Authorized by: Celestine Baeza DO    Performed by: Celestine Baeza DO  Preanesthetic Checklist     Timeout performed at: 11/16/2023 12:20 PM  Peripheral Block  Patient position: laying flat  Block type: sciatic  Injection technique: single-shot  Local infiltration: ropivacaine  Infiltration strength: 0.5 %  Dose: 15 mL  Needle  Needle gauge: 22 G  Needle localization: ultrasound guidance  Assessment  Injection assessment: negative aspiration for heme, no paresthesia on injection, incremental injection and local visualized surrounding nerve on ultrasound  Paresthesia pain: none  Heart rate change: no  Slow fractionated injection: yes

## 2023-11-16 NOTE — ANESTHESIA POSTPROCEDURE EVALUATION
Patient: Sai Scanlon    Procedure Summary       Date: 11/16/23 Room / Location: [facility] OR 02 / [facility]    Anesthesia Start: 1231 Anesthesia Stop: 1456    Procedure: Arthroscopy Ankle with drilling of OCD talus with possible biocartilage graft, subtalar joint arthroscopy with debridement, peroneal tendinoscopy with possible peroneal tendon repair (Left: Ankle) Diagnosis:       Ankle impingement syndrome, left      Osteochondral defect of talus      Peroneal tendinitis of left lower extremity      Ankylosis of subtalar joint      (Ankle impingement syndrome, left [M25.872])      (Osteochondral defect of talus [M95.8])      (Peroneal tendinitis of left lower extremity [M76.72])      (Ankylosis of subtalar joint [M24.676])    Surgeons: Emma Gu MD Responsible Provider: RICHARD Chiu    Anesthesia Type: general, regional ASA Status: 2            Anesthesia Type: general, regional    Vitals Value Taken Time   /60 11/16/23 1520   Temp 36.3 °C (97.3 °F) 11/16/23 1454   Pulse 88 11/16/23 1520   Resp 16 11/16/23 1520   SpO2 98 % 11/16/23 1520       Anesthesia Post Evaluation    Patient location during evaluation: PACU  Patient participation: complete - patient participated  Level of consciousness: awake  Pain management: satisfactory to patient  Airway patency: patent  Cardiovascular status: acceptable  Respiratory status: acceptable  Hydration status: acceptable  Postoperative Nausea and Vomiting: none  Comments: No respiratory difficulty    Very comfortable        No notable events documented.

## 2023-11-22 NOTE — PROGRESS NOTES
This is a pleasant 43 y.o. year old female who presents for fuv of  left ankle pain  despite a full course of conservative treatment.  She comes in to discuss surgery.    PHYSICAL EXAMINATION  Constitutional Exam: patient's height and weight reviewed, well-kempt  Psychiatric Exam: alert and oriented x 3, appropriate mood and behavior  Eye Exam: CHAPIN, EOMI  Pulmonary Exam: breathing non-labored, no apparent distress  Lymphatic exam: no appreciable lymphadenopathy in the lower extremities  Cardiovascular exam: DP pulses 2+ bilaterally, PT 2+ bilaterally, toes are pink with good capillary refill, no pitting edema  Skin exam: no open lesions, rashes, abrasions or ulcerations  Neurological exam: sensation to light touch intact in both lower extremities in peripheral and dermatomal distributions (except for any abnormalities noted in musculoskeletal exam)    Musculoskeletal exam: left ankle: tender over anterior ankle joint line with positive passive hyperdorsiflexion testingt, soreness along sinus tarsi/peroneals, stable ligamentous exam    ASSESSMENT: left anterior ankle impingement, peroneal tendinitis versus tear, pain with motion subtalar joint  PLAN: Further treatment options discussed including surgical intervention was discussed.  Due to significant decreased function, patient and I discussed surgery: including ankle arthroscopy with extensive debridement, subtalar joint arthroscopy with debridement, peroneal tendoscopy with open repair of peroneal tendons if a tear is found.  We will also have to assess her osteochondral defect as there is concerned that it may be unstable due to her deep catching and popping and locking symptoms along with recurrent effusions.  If we have to perform microfracture of the osteochondral defect then she will have to be nonweightbearing for 6 weeks total.  We discussed with her the postoperative course and extensive rehabilitation needed after surgery in detail.  She will be  splinted first and then casted and then transition to boot around the 3rd-6th week depending on her progress.  Her recovery is around 3 months, full recovery can take 6 months to year.  Discussed with her risks of surgery including pain, bleeding, infection, wound healing problems, osteochondral defect not healing after microfracture requiring more invasive grafting type procedure, posttraumatic arthritis developing in the ankle at a later time requiring further treatment, chronic ankle pain stiffness or swelling, DVT, PE and other medical complications including tendon retear.  The patient's questions were answered in detail.      I discussed with patient the procedure in detail, risks and benefits of procedure, post-op protocol, anesthetic used with possible regional block, timeline of recovery, need for protective weightbearing and/or bracing after procedure, pain management protocol, DVT prophylaxis protocol, home preparation suggestions, pre-op surgery preparation, and other pertinent information.    Note dictated with Key[employer]ker software, completed without full type editing to avoid delay.

## 2023-11-27 ENCOUNTER — OFFICE VISIT (OUTPATIENT)
Dept: ORTHOPEDIC SURGERY | Facility: HOSPITAL | Age: 43
End: 2023-11-27
Payer: COMMERCIAL

## 2023-11-27 DIAGNOSIS — M76.72 PERONEAL TENDINITIS OF LEFT LOWER EXTREMITY: ICD-10-CM

## 2023-11-27 DIAGNOSIS — M95.8 OSTEOCHONDRAL DEFECT OF TALUS: Primary | ICD-10-CM

## 2023-11-27 DIAGNOSIS — M25.872 ANKLE IMPINGEMENT SYNDROME, LEFT: ICD-10-CM

## 2023-11-27 PROCEDURE — 1036F TOBACCO NON-USER: CPT | Performed by: ORTHOPAEDIC SURGERY

## 2023-11-27 PROCEDURE — 99024 POSTOP FOLLOW-UP VISIT: CPT | Performed by: ORTHOPAEDIC SURGERY

## 2023-11-27 NOTE — PROGRESS NOTES
Patient comes in for fuv on her left ankle.  Pain is doing better.  Taking ASA daily for DVT prophylaxis.  She did fall twice early on and placed some weight on left foot.  Doing better now and able to use knee walker well.    Physical Exam:  the left ankle and foot : incisions clean/dry intact, calf soft and supple, toes pink and warm with good capillary refill, pulses intact, limb swelling appropriate, wiggles toes    Assessment: s/p left ankle scope with extensive debridement with subchondral drilling OCD medial talus and lateral tibia with OCD but intact cartilage cap, subtalar joint arthroscopy with debridement  Plan:   - Weightbearing instructions and restrictions discussed with patient, NWB  - DVT prophylaxis finish up ASA  - follow-up visit 1 week  - PT orders placed, instructed patient to call ahead to schedule appointment  Patient's questions were answered in detail and they are agreeable to above plan.

## 2023-12-01 ENCOUNTER — EVALUATION (OUTPATIENT)
Dept: PHYSICAL THERAPY | Facility: CLINIC | Age: 43
End: 2023-12-01
Payer: COMMERCIAL

## 2023-12-01 DIAGNOSIS — M25.872 ANKLE IMPINGEMENT SYNDROME, LEFT: Primary | ICD-10-CM

## 2023-12-01 DIAGNOSIS — M95.8 OSTEOCHONDRAL DEFECT OF TALUS: ICD-10-CM

## 2023-12-01 DIAGNOSIS — M76.72 PERONEAL TENDINITIS OF LEFT LOWER EXTREMITY: ICD-10-CM

## 2023-12-01 DIAGNOSIS — M24.676 ANKYLOSIS OF SUBTALAR JOINT: ICD-10-CM

## 2023-12-01 PROCEDURE — 97110 THERAPEUTIC EXERCISES: CPT | Mod: GP | Performed by: PHYSICAL THERAPIST

## 2023-12-01 PROCEDURE — 97161 PT EVAL LOW COMPLEX 20 MIN: CPT | Mod: GP | Performed by: PHYSICAL THERAPIST

## 2023-12-01 ASSESSMENT — ENCOUNTER SYMPTOMS
OCCASIONAL FEELINGS OF UNSTEADINESS: 0
LOSS OF SENSATION IN FEET: 0
DEPRESSION: 0

## 2023-12-01 NOTE — PROGRESS NOTES
Physical Therapy Evaluation    Patient Name: Sai Scanlon  MRN: [id number]  Today's Date: 12/1/2023  Visit: 1  Referred by: Dr. Gu  Diagnosis:   1. Ankle impingement syndrome, left        2. Osteochondral defect of talus        Precautions: NWB x 4 weeks starting 11-26-23  SUBJECTIVE:  43 y.o. english speaking female s/p L) ankle scope for extensive debridement and subchondral drilling of medial talar and lateral tibial OCD, 11-16-23 by Dr. Gu.    Pain: 3-4/10 anterolateral ankle L)    Home Living:  Lives with   Prior level of function:      OBJECTIVE:  Ambulates with kneeling scooter and also CW.  Pt. Is independent with CW on levels.  ROM L) ankle: DF- 7, PF- 30, EV- 10, IN- 15  Still with bandages over incision sites.  To have suture removal next week.     Pt. With intact light touch L) foot.  Pedal pulse is regular and strong  Good toe movement    Outcome Measure:  FAAM- 24%    ASSESSMENT:  Pt. S/p ankle scope procedure as above.  She has painful limited ROM, decreased LE strength, and limited WB status.  She requires PT to address these issues to allow a return to full ADLs.  Emphasis early on will be ROM with progression to gait/ambulation and strengthening as able.    TREATMENT:  - Therex:  Toe curling 90 sec. X 2  Fitter seated rockits 2way x 30 ea.  Seated DF/PF x 2x10 ea.  Supine active HS S w/DF 10 x 2  SLR 2x10  ABD SLR 2x10  Prone SLR 2x10  Prone bend knee ankle DF/PF 2 x10    PATIENT EDUCATION:  HEP    PLAN:   Daily HEP  Weekly PT x 12 weeks  Rehab potential:  Good  Plan of care agreement: Y    GOALS:  Active       PT Problem       Pt. will have improved pain free ROM to assist with improving functional tolerances       Start:  12/01/23    Expected End:  02/29/24            Pt will have improved LE strength to assist with improving functional tolerances       Start:  12/01/23    Expected End:  02/29/24            Pt. will have improved FAAM score by at lest 15%       Start:  12/01/23     Expected End:  02/29/24            Pt. will have improved tolerances for stairs, standing and walking       Start:  12/01/23    Expected End:  02/29/24            Pt. will be independent with HEP       Start:  12/01/23    Expected End:  02/29/24            Pt will have decreased reports of pain by at least 2 levels using a VAS       Start:  12/01/23    Expected End:  02/29/24

## 2023-12-04 ENCOUNTER — OFFICE VISIT (OUTPATIENT)
Dept: ORTHOPEDIC SURGERY | Facility: HOSPITAL | Age: 43
End: 2023-12-04
Payer: COMMERCIAL

## 2023-12-04 DIAGNOSIS — M95.8 OSTEOCHONDRAL DEFECT OF TALUS: Primary | ICD-10-CM

## 2023-12-04 PROCEDURE — 1036F TOBACCO NON-USER: CPT | Performed by: PHYSICIAN ASSISTANT

## 2023-12-04 PROCEDURE — 99024 POSTOP FOLLOW-UP VISIT: CPT | Performed by: PHYSICIAN ASSISTANT

## 2023-12-04 NOTE — PROGRESS NOTES
Patient comes in for fuv on her left ankle. NWB on the boot. No pain.  Taking ASA daily for DVT prophylaxis.  Started PT and doing HEP. Doing better now and able to use knee walker well.    Physical Exam:  the left ankle and foot : incisions clean/dry intact, calf soft and supple, toes pink and warm with good capillary refill, pulses intact, limb swelling appropriate, wiggles toes    Assessment: s/p left ankle scope with extensive debridement with subchondral drilling OCD medial talus and lateral tibia with OCD but intact cartilage cap, subtalar joint arthroscopy with debridement  Plan:   - Weightbearing instructions and restrictions discussed with patient, NWB  - Sutures removed and incision clean and dressed  - follow-up visit 3 weeks  - PT orders placed, instructed patient to call ahead to schedule appointment  - Patient was given a handout and instructed on an at home stretching program.  They should do these exercises 3 times per day for 6 weeks and then daily. Patient can use OTC Voltaren gel, biofreeze or  aspercream for pain and continue to ice and elevate supported at the calf to reduce swelling  Patient's questions were answered in detail and they are agreeable to above plan.

## 2023-12-04 NOTE — PATIENT INSTRUCTIONS
YOUR BOOT INSTRUCTIONS:  You CANNOT put weight on your foot and ankle while in the boot.    You can use crutches or walker for support as needed.   You should wear boot when walking or standing  You can take off your boot while bathing, sitting, stretching, icing or doing therapy exercises.  You can take your boot off at nighttime while sleeping.    Keep your incisions clean and dry    Ice and elevate supported at the calf to reduce swelling    The patient was given a prescription for physical therapy.  Physical therapy is medically necessary to improve strength, balance, range of motion and functional outcomes after injury and/or surgery.    Follow up in 3 weeks

## 2023-12-07 ENCOUNTER — TREATMENT (OUTPATIENT)
Dept: PHYSICAL THERAPY | Facility: CLINIC | Age: 43
End: 2023-12-07
Payer: COMMERCIAL

## 2023-12-07 DIAGNOSIS — M95.8 OSTEOCHONDRAL DEFECT OF TALUS: ICD-10-CM

## 2023-12-07 DIAGNOSIS — M76.72 PERONEAL TENDINITIS OF LEFT LOWER EXTREMITY: ICD-10-CM

## 2023-12-07 DIAGNOSIS — M24.676 ANKYLOSIS OF SUBTALAR JOINT: ICD-10-CM

## 2023-12-07 DIAGNOSIS — M25.872 ANKLE IMPINGEMENT SYNDROME, LEFT: Primary | ICD-10-CM

## 2023-12-07 PROCEDURE — 97140 MANUAL THERAPY 1/> REGIONS: CPT | Mod: GP,CQ | Performed by: PHYSICAL THERAPY ASSISTANT

## 2023-12-07 PROCEDURE — 97110 THERAPEUTIC EXERCISES: CPT | Mod: GP,CQ | Performed by: PHYSICAL THERAPY ASSISTANT

## 2023-12-07 NOTE — PROGRESS NOTES
"Physical Therapy Treatment    Patient Name: Sai Scanlon  MRN: [id number]  Today's Date: 12/7/2023  Visit# 2  Diagnosis:   1. Ankle impingement syndrome, left        2. Osteochondral defect of talus        3. Peroneal tendinitis of left lower extremity        4. Ankylosis of subtalar joint             PRECAUTIONS:      SUBJECTIVE:  Pt enters into therapy reporting pain is particularly minimal but at times while performing HEP she occasionally hears a clicking followed by pain and pain can reach to as high as 5/10 grade.     OBJECTIVE:  Precautions: NWB x 4 weeks starting 11-26-23     ROM still restricted. Performed exercises well.     TREATMENT:  - Therex:   Seated Heel slides with slider 3'  Seated Fitterboard AP/LAT x30  Seated DF/PF 3x10  Seated BOSU, AP, LAT, CW,CCW x30ea  Supine calf str with strap 30\"x3  SLR 3x10  S/L hip abd 3x10  Prone leg raises 3x10    - Manual Therapy:   L ankle gentle PROM, all planes, x10'    - Neuromuscular Re-education:        - Gait Train:       - Modalities:           ASSESSMENT:   Pain primarily at end range DF/PF. Responded well to work load. Independent with HEP and compliant to 's orders.     PLAN:    Cont with current plan with NWB       "

## 2023-12-15 ENCOUNTER — TREATMENT (OUTPATIENT)
Dept: PHYSICAL THERAPY | Facility: CLINIC | Age: 43
End: 2023-12-15
Payer: COMMERCIAL

## 2023-12-15 DIAGNOSIS — M25.872 ANKLE IMPINGEMENT SYNDROME, LEFT: ICD-10-CM

## 2023-12-15 DIAGNOSIS — M95.8 OSTEOCHONDRAL DEFECT OF TALUS: ICD-10-CM

## 2023-12-15 DIAGNOSIS — M24.676 ANKYLOSIS OF SUBTALAR JOINT: ICD-10-CM

## 2023-12-15 PROCEDURE — 97110 THERAPEUTIC EXERCISES: CPT | Mod: GP | Performed by: PHYSICAL THERAPIST

## 2023-12-15 NOTE — PROGRESS NOTES
"Physical Therapy Evaluation    Patient Name: Sai Scanlon  MRN: [id number]  Today's Date: 12/1/2023  Visit: 3  Referred by: Dr. Gu  Diagnosis:   1. Ankle impingement syndrome, left        2. Osteochondral defect of talus        Precautions: NWB x 4 weeks starting 11-26-23  SUBJECTIVE:  43 y.o. female s/p L) ankle scope for extensive debridement and subchondral drilling of medial talar and lateral tibial OCD, 11-16-23 by Dr. Gu.    She is having very little pain now.   0-2/10 anterolateral ankle L)  HEP: Y    OBJECTIVE:  Ambulates with kneeling scooter and also CW.  Pt. Is independent with CW on levels.  ROM L) ankle: DF- 7, PF- 30, EV- 10, IN- 15  Still with bandages over incision sites.  To have suture removal next week.     Pt. With intact light touch L) foot.  Pedal pulse is regular and strong  Good toe movement    Outcome Measure:  FAAM- 24%    ASSESSMENT:  Pt. S/p ankle scope procedure as above.  Pain primarily at end range DF/PF. Responded well to work load. Independent with HEP and compliant to Dr's orders.     TREATMENT:  - Therex:  Recumbent bike L4 x 5 min  Strap calf S 30\" x 3  Fitter seated rockits 2way x 30 ea.  BAPS L4 CW/CCW x 15 ea. X 2  Seated DF  3x15 ea.  Seated active HS S w/DF 10 x 2  SLR 3x10  2#  ABD SLR 3x10 2#  Prone SLR 3x10 2#  Prone bend knee ankle DF/PF 2 x10  Tband PF blue 3 x10  TG mini-Leg Press unloaded L4  4 x 15  4-pt sit back PF 10\" x 10      - Manual Therapy:   L ankle gentle PROM, all planes,     PLAN:   Continue daily HEP.  F/U next week and progress with AA and AROM as able.    GOALS:  Active       PT Problem       Pt. will have improved pain free ROM to assist with improving functional tolerances       Start:  12/01/23    Expected End:  02/29/24            Pt will have improved LE strength to assist with improving functional tolerances       Start:  12/01/23    Expected End:  02/29/24            Pt. will have improved FAAM score by at lest 15%       Start:  12/01/23 "    Expected End:  02/29/24            Pt. will have improved tolerances for stairs, standing and walking       Start:  12/01/23    Expected End:  02/29/24            Pt. will be independent with HEP       Start:  12/01/23    Expected End:  02/29/24            Pt will have decreased reports of pain by at least 2 levels using a VAS       Start:  12/01/23    Expected End:  02/29/24

## 2023-12-22 ENCOUNTER — TREATMENT (OUTPATIENT)
Dept: PHYSICAL THERAPY | Facility: CLINIC | Age: 43
End: 2023-12-22
Payer: COMMERCIAL

## 2023-12-22 DIAGNOSIS — M24.676 ANKYLOSIS OF SUBTALAR JOINT: ICD-10-CM

## 2023-12-22 DIAGNOSIS — M25.872 ANKLE IMPINGEMENT SYNDROME, LEFT: Primary | ICD-10-CM

## 2023-12-22 DIAGNOSIS — M76.72 PERONEAL TENDINITIS OF LEFT LOWER EXTREMITY: ICD-10-CM

## 2023-12-22 DIAGNOSIS — M95.8 OSTEOCHONDRAL DEFECT OF TALUS: ICD-10-CM

## 2023-12-22 PROCEDURE — 97110 THERAPEUTIC EXERCISES: CPT | Mod: GP | Performed by: PHYSICAL THERAPIST

## 2023-12-22 NOTE — PROGRESS NOTES
"Physical Therapy Evaluation    Patient Name: Sai Scanlon  MRN: [id number]  Today's Date: 12/1/2023  Visit: 4  Referred by: Dr. Gu  Diagnosis:   1. Ankle impingement syndrome, left        2. Osteochondral defect of talus        Precautions: NWB x 4 weeks starting 11-26-23  SUBJECTIVE:  43 y.o. female s/p L) ankle scope for extensive debridement and subchondral drilling of medial talar and lateral tibial OCD, 11-16-23 by Dr. Gu.    She reports infrequent ache in the left ankle.  No issues with HEP.      OBJECTIVE:  Ambulates with independently with CW.  ROM L) ankle: DF- 7, PF- 30, EV- 10, IN- 15    Outcome Measure:  FAAM- 24%    ASSESSMENT:  Pt. S/p ankle scope procedure as above.  Pain primarily at end range DF/PF. Responded well to work load. Independent with HEP and compliant to Dr's orders.     TREATMENT:  - Therex:   bike L4 x 5 min  Strap calf S 30\" x 3  Standing TD Fitter rockits 2way x 30 ea.  BAPS L4 CW/CCW x 15 ea. X 2  Seated calf raise  3x15 ea.  SLR 3x10  3#  MultiHip ABD 3  3x10  MultiHip EXT 5  3x10    Tband ankle INV,EV, DF > 2x10  R  Step DF S  Tband PF black 3 x15  TG mini-Leg Press unloaded L5  4 x 15  4-pt sit back PF 10\" x 10      - Manual Therapy:   L ankle gentle PROM, all planes,     PLAN:   Continue daily HEP.  F/U next week and progress with ROM and strength as able.    GOALS:  Active       PT Problem       Pt. will have improved pain free ROM to assist with improving functional tolerances       Start:  12/01/23    Expected End:  02/29/24            Pt will have improved LE strength to assist with improving functional tolerances       Start:  12/01/23    Expected End:  02/29/24            Pt. will have improved FAAM score by at lest 15%       Start:  12/01/23    Expected End:  02/29/24            Pt. will have improved tolerances for stairs, standing and walking       Start:  12/01/23    Expected End:  02/29/24            Pt. will be independent with HEP       Start:  12/01/23    " Expected End:  02/29/24            Pt will have decreased reports of pain by at least 2 levels using a VAS       Start:  12/01/23    Expected End:  02/29/24

## 2023-12-29 ENCOUNTER — APPOINTMENT (OUTPATIENT)
Dept: PHYSICAL THERAPY | Facility: CLINIC | Age: 43
End: 2023-12-29
Payer: COMMERCIAL

## 2023-12-29 ENCOUNTER — OFFICE VISIT (OUTPATIENT)
Dept: ORTHOPEDIC SURGERY | Facility: CLINIC | Age: 43
End: 2023-12-29
Payer: COMMERCIAL

## 2023-12-29 ENCOUNTER — APPOINTMENT (OUTPATIENT)
Dept: ORTHOPEDIC SURGERY | Facility: CLINIC | Age: 43
End: 2023-12-29
Payer: COMMERCIAL

## 2023-12-29 DIAGNOSIS — M24.676 ANKYLOSIS OF SUBTALAR JOINT: Primary | ICD-10-CM

## 2023-12-29 DIAGNOSIS — M95.8 OSTEOCHONDRAL DEFECT OF TALUS: ICD-10-CM

## 2023-12-29 DIAGNOSIS — M76.72 PERONEAL TENDINITIS OF LEFT LOWER EXTREMITY: ICD-10-CM

## 2023-12-29 DIAGNOSIS — M25.872 ANKLE IMPINGEMENT SYNDROME, LEFT: ICD-10-CM

## 2023-12-29 PROCEDURE — 1036F TOBACCO NON-USER: CPT | Performed by: ORTHOPAEDIC SURGERY

## 2023-12-29 PROCEDURE — 99024 POSTOP FOLLOW-UP VISIT: CPT | Performed by: ORTHOPAEDIC SURGERY

## 2023-12-29 ASSESSMENT — PAIN - FUNCTIONAL ASSESSMENT: PAIN_FUNCTIONAL_ASSESSMENT: 0-10

## 2024-01-02 ENCOUNTER — TREATMENT (OUTPATIENT)
Dept: PHYSICAL THERAPY | Facility: CLINIC | Age: 44
End: 2024-01-02
Payer: COMMERCIAL

## 2024-01-02 DIAGNOSIS — M25.872 ANKLE IMPINGEMENT SYNDROME, LEFT: ICD-10-CM

## 2024-01-02 DIAGNOSIS — M95.8 OSTEOCHONDRAL DEFECT OF TALUS: ICD-10-CM

## 2024-01-02 DIAGNOSIS — M24.676 ANKYLOSIS OF SUBTALAR JOINT: ICD-10-CM

## 2024-01-02 PROCEDURE — 97110 THERAPEUTIC EXERCISES: CPT | Mod: GP | Performed by: PHYSICAL THERAPIST

## 2024-01-02 NOTE — PROGRESS NOTES
"Physical Therapy Evaluation    Patient Name: Sai Scanlon  MRN: [id number]  Today's Date: 1/2/2024  Visit: 5  Referred by: Dr. Gu  Diagnosis:   1. Ankle impingement syndrome, left        2. Osteochondral defect of talus        Precautions: Ok'd 12/29 to start WBAT    SUBJECTIVE:  43 y.o. female s/p L) ankle scope for extensive debridement and subchondral drilling of medial talar and lateral tibial OCD, 11-16-23 by Dr. Gu.    She reports infrequent ache in the left ankle.  No issues with HEP.  Saw surgeon and ok'd for WBAT.      OBJECTIVE:  Tolerated progression to WBAT on L) LE today.  Was independent with CW WBAT L) after session.  ROM L) ankle: DF- 12, PF- 45, EV- 18, IN- 30    Outcome Measure:  FAAM- 24%    ASSESSMENT:  Pt. S/p ankle scope procedure as above.  Pain primarily at end range DF/PF. Responded well to work load. Pt. With improved gait and ROM today.    TREATMENT:  - Therex:   bike L6 x 5 min  Strap calf S 30\" x 3  Standing  Fitter rockits 2way x 60\" ea.  Seated calf raise 10# 3x12 ea.  Tband ankle INV,EV, DF > 3x15  G  Step DF S 10\" x 10  Tband PF black 3 x20  TG mini-Leg Press unloaded L7  3 x 15  TG L7 calf raises  3 x 15  4-pt sit back PF 10\" x 10    WBAT ) LE CW with normal gait sequence 20' x 6    PLAN:   Continue daily HEP.  F/U will progress to biweekly as able.    GOALS:  Active       PT Problem       Pt. will have improved pain free ROM to assist with improving functional tolerances       Start:  12/01/23    Expected End:  02/29/24            Pt will have improved LE strength to assist with improving functional tolerances       Start:  12/01/23    Expected End:  02/29/24            Pt. will have improved FAAM score by at lest 15%       Start:  12/01/23    Expected End:  02/29/24            Pt. will have improved tolerances for stairs, standing and walking       Start:  12/01/23    Expected End:  02/29/24            Pt. will be independent with HEP       Start:  12/01/23    Expected " End:  02/29/24            Pt will have decreased reports of pain by at least 2 levels using a VAS       Start:  12/01/23    Expected End:  02/29/24

## 2024-01-05 ENCOUNTER — TREATMENT (OUTPATIENT)
Dept: PHYSICAL THERAPY | Facility: CLINIC | Age: 44
End: 2024-01-05
Payer: COMMERCIAL

## 2024-01-05 DIAGNOSIS — M24.676 ANKYLOSIS OF SUBTALAR JOINT: ICD-10-CM

## 2024-01-05 DIAGNOSIS — M76.72 PERONEAL TENDINITIS OF LEFT LOWER EXTREMITY: Primary | ICD-10-CM

## 2024-01-05 DIAGNOSIS — M25.872 ANKLE IMPINGEMENT SYNDROME, LEFT: ICD-10-CM

## 2024-01-05 DIAGNOSIS — M95.8 OSTEOCHONDRAL DEFECT OF TALUS: ICD-10-CM

## 2024-01-05 PROCEDURE — 97110 THERAPEUTIC EXERCISES: CPT | Mod: GP | Performed by: PHYSICAL THERAPIST

## 2024-01-05 NOTE — PROGRESS NOTES
"Physical Therapy Evaluation    Patient Name: Sai Scanlon  MRN: [id number]  Today's Date: 1/5/2024  Visit: 6  Referred by: Dr. Gu  Diagnosis:   1. Ankle impingement syndrome, left        2. Osteochondral defect of talus        Precautions: WBAT    SUBJECTIVE:  43 y.o. female s/p L) ankle scope for extensive debridement and subchondral drilling of medial talar and lateral tibial OCD, 11-16-23 by Dr. Gu.    She feels she over did yesterday.  Was up on her feet out of boot for almost the entire day.  Ankle was sore later in afternoon and evening.  Back to baseline this AM.      OBJECTIVE:  Tolerated progression to WBAT on L) LE today.  Was independent with CW WBAT L) after session.  ROM L) ankle: DF- 12, PF- 45, EV- 18, IN- 30    Outcome Measure:  FAAM- 24%    ASSESSMENT:  Responded well to work load. Pt. With improved gait and ROM today.    TREATMENT:  - Therex:   bike L6 x 5 min  Strap calf S 30\" x 3  Standing  Fitter rockits 2way x 60\" ea.  Seated calf raise 12# 3x15 ea.  Tband ankle INV,EV, DF > 3x15  G  Step DF S 10\" x 10  Tband PF black 3 x20  TG mini-Leg Press unloaded L7  3 x 15  TG L7 calf raises  3 x 15  4-pt sit back PF 10\" x 10  SLSB 30\" x 3    PLAN:   Continue daily HEP.  F/U next week and continue 2x/week.    GOALS:  Active       PT Problem       Pt. will have improved pain free ROM to assist with improving functional tolerances       Start:  12/01/23    Expected End:  02/29/24            Pt will have improved LE strength to assist with improving functional tolerances       Start:  12/01/23    Expected End:  02/29/24            Pt. will have improved FAAM score by at lest 15%       Start:  12/01/23    Expected End:  02/29/24            Pt. will have improved tolerances for stairs, standing and walking       Start:  12/01/23    Expected End:  02/29/24            Pt. will be independent with HEP       Start:  12/01/23    Expected End:  02/29/24            Pt will have decreased reports of pain by " at least 2 levels using a VAS       Start:  12/01/23    Expected End:  02/29/24

## 2024-01-09 ENCOUNTER — TREATMENT (OUTPATIENT)
Dept: PHYSICAL THERAPY | Facility: CLINIC | Age: 44
End: 2024-01-09
Payer: COMMERCIAL

## 2024-01-09 DIAGNOSIS — M25.872 ANKLE IMPINGEMENT SYNDROME, LEFT: ICD-10-CM

## 2024-01-09 DIAGNOSIS — M95.8 OSTEOCHONDRAL DEFECT OF TALUS: ICD-10-CM

## 2024-01-09 DIAGNOSIS — M24.676 ANKYLOSIS OF SUBTALAR JOINT: ICD-10-CM

## 2024-01-09 PROCEDURE — 97110 THERAPEUTIC EXERCISES: CPT | Mod: GP | Performed by: PHYSICAL THERAPIST

## 2024-01-09 NOTE — PROGRESS NOTES
"Physical Therapy Evaluation    Patient Name: Sai Scanlon  MRN: [id number]  Today's Date: 1/9/2024  Visit: 7  Referred by: Dr. Gu  Diagnosis:   1. Ankle impingement syndrome, left        2. Osteochondral defect of talus        Precautions: WBAT    SUBJECTIVE:  43 y.o. female s/p L) ankle scope for extensive debridement and subchondral drilling of medial talar and lateral tibial OCD, 11-16-23 by Dr. Gu.    She states that she has \"turned a page\" and the ankle is feeling a lot better.      OBJECTIVE:  Tolerated progression to WBAT on L) LE today.  Was independent with CW WBAT L) after session.  ROM L) ankle: DF- 12, PF- 45, EV- 18, IN- 30    Outcome Measure:  FAAM- 24%    ASSESSMENT:  Responded well to work load. Pt. With improved gait and ROM today.    TREATMENT:  - Therex:   bike L6 x 5 min  Incline calf S 60\"   Standing  Fitter rockits 2way x 60\" ea.  Seated calf raise 15# 3x15 ea.  Tband ankle INV,EV, DF > 3x15  G  Step DF S 10\" x 10  Tband PF black 3 x20  TG mini-Leg Press unloaded L7+ 1/2  3 x 15  TG L7 calf raises  3 x 15  4-pt sit back PF 10\" x 10  SLSB 30\" x 3  Airex march in place 30\" x 3  Airex step touches 3x10  SLSB target touches 2x20  Bridges 3x10    PLAN:   Continue daily HEP.  F/U next week.    GOALS:  Active       PT Problem       Pt. will have improved pain free ROM to assist with improving functional tolerances       Start:  12/01/23    Expected End:  02/29/24            Pt will have improved LE strength to assist with improving functional tolerances       Start:  12/01/23    Expected End:  02/29/24            Pt. will have improved FAAM score by at lest 15%       Start:  12/01/23    Expected End:  02/29/24            Pt. will have improved tolerances for stairs, standing and walking       Start:  12/01/23    Expected End:  02/29/24            Pt. will be independent with HEP       Start:  12/01/23    Expected End:  02/29/24            Pt will have decreased reports of pain by at least 2 " levels using a VAS       Start:  12/01/23    Expected End:  02/29/24

## 2024-01-11 ENCOUNTER — TREATMENT (OUTPATIENT)
Dept: PHYSICAL THERAPY | Facility: CLINIC | Age: 44
End: 2024-01-11
Payer: COMMERCIAL

## 2024-01-11 DIAGNOSIS — M95.8 OSTEOCHONDRAL DEFECT OF TALUS: ICD-10-CM

## 2024-01-11 DIAGNOSIS — M25.872 ANKLE IMPINGEMENT SYNDROME, LEFT: Primary | ICD-10-CM

## 2024-01-11 DIAGNOSIS — M24.676 ANKYLOSIS OF SUBTALAR JOINT: ICD-10-CM

## 2024-01-11 DIAGNOSIS — M76.72 PERONEAL TENDINITIS OF LEFT LOWER EXTREMITY: ICD-10-CM

## 2024-01-11 PROCEDURE — 97110 THERAPEUTIC EXERCISES: CPT | Mod: GP | Performed by: PHYSICAL THERAPIST

## 2024-01-11 NOTE — PROGRESS NOTES
"Physical Therapy Evaluation    Patient Name: Sai Scanlon  MRN: [id number]  Today's Date: 1/11/2024  Visit: 8  Referred by: Dr. Gu  Diagnosis:   1. Ankle impingement syndrome, left        2. Osteochondral defect of talus        Precautions: WBAT    SUBJECTIVE:  43 y.o. female s/p L) ankle scope for extensive debridement and subchondral drilling of medial talar and lateral tibial OCD, 11-16-23 by Dr. Gu.      OBJECTIVE:  Ambulating well out of boot today.  ROM L) ankle: DF- 12, PF- 45, EV- 20, IN- 35    Outcome Measure:  FAAM- 24%    ASSESSMENT:  Responded well to work load. Pt. With improved gait and ROM today.    TREATMENT:  - Therex:   bike L6 x 5 min  Incline calf S 60\"   Standing  Fitter rockits 2way x 60\" ea.  Step DF S 10\" x 10  SL Bridges 3x10  4-pt sit back PF 10\" x 10  Tband PF black 3 x20  Seated calf raise 15# 3x15 ea.  Tband ankle INV,EV, DF > 3x15  G  TG mini-Leg Press unloaded L7+ 1  3 x 15  TG L7 calf raises  3 x 15  Airex SLSB 30\" x 3  Airex march in place 60\" x 2  Airex step touches 3x20  BAPS bilat. Cw/ccw in stand  2x15 ea.  4 way Steamboats    PLAN:   Continue daily HEP.  F/U next week.    GOALS:  Active       PT Problem       Pt. will have improved pain free ROM to assist with improving functional tolerances       Start:  12/01/23    Expected End:  02/29/24            Pt will have improved LE strength to assist with improving functional tolerances       Start:  12/01/23    Expected End:  02/29/24            Pt. will have improved FAAM score by at lest 15%       Start:  12/01/23    Expected End:  02/29/24            Pt. will have improved tolerances for stairs, standing and walking       Start:  12/01/23    Expected End:  02/29/24            Pt. will be independent with HEP       Start:  12/01/23    Expected End:  02/29/24            Pt will have decreased reports of pain by at least 2 levels using a VAS       Start:  12/01/23    Expected End:  02/29/24                "

## 2024-01-19 ENCOUNTER — TREATMENT (OUTPATIENT)
Dept: PHYSICAL THERAPY | Facility: CLINIC | Age: 44
End: 2024-01-19
Payer: COMMERCIAL

## 2024-01-19 DIAGNOSIS — M24.676 ANKYLOSIS OF SUBTALAR JOINT: ICD-10-CM

## 2024-01-19 DIAGNOSIS — M25.872 ANKLE IMPINGEMENT SYNDROME, LEFT: ICD-10-CM

## 2024-01-19 DIAGNOSIS — M95.8 OSTEOCHONDRAL DEFECT OF TALUS: ICD-10-CM

## 2024-01-19 PROCEDURE — 97110 THERAPEUTIC EXERCISES: CPT | Mod: GP | Performed by: PHYSICAL THERAPIST

## 2024-01-19 NOTE — PROGRESS NOTES
"Physical Therapy Evaluation    Patient Name: Sai Scanlon  MRN: [id number]  Today's Date: 1/19/2024  Visit: 9  Referred by: Dr. Gu  Diagnosis:   1. Ankle impingement syndrome, left        2. Osteochondral defect of talus        Precautions: WBAT    SUBJECTIVE:  43 y.o. female s/p L) ankle scope for extensive debridement and subchondral drilling of medial talar and lateral tibial OCD, 11-16-23 by Dr. Gu.  Reports mild ache this morning 2-3/10.   Was up on her feet a lot yesterday.  HEP: Y    OBJECTIVE:  Ambulating well out of boot today.  ROM L) ankle: DF- 17, PF- 55, EV- 20, IN- 35    Outcome Measure:  FAAM- 24%    ASSESSMENT:  Responded well to exercise today.    TREATMENT:  - Therex:   bike L7 x 5 min  Incline calf S 60\"   Standing  Fitter rockits 2way x 60\" ea.  BAPS bilat. Cw/ccw in stand  2x15 ea.   Step DF S 10\" x 10  SL Bridges 3x10  4-pt sit back PF 10\" x 10  Seated calf raise 20# 3x10 ea.  Tband ankle INV,EV, DF > 3x15  G  TG mini-Leg Press unloaded L7+ 1.5  3 x 15  TG L7 calf raises  3 x 15  Airex SLSB 30\" x 3  Airex target touches x60\"   Airex step touches 3x20  4 way Steamboats G  x10 ea.    PLAN:   Continue daily HEP.  F/U next week.    GOALS:  Active       PT Problem       Pt. will have improved pain free ROM to assist with improving functional tolerances       Start:  12/01/23    Expected End:  02/29/24            Pt will have improved LE strength to assist with improving functional tolerances       Start:  12/01/23    Expected End:  02/29/24            Pt. will have improved FAAM score by at lest 15%       Start:  12/01/23    Expected End:  02/29/24            Pt. will have improved tolerances for stairs, standing and walking       Start:  12/01/23    Expected End:  02/29/24            Pt. will be independent with HEP       Start:  12/01/23    Expected End:  02/29/24            Pt will have decreased reports of pain by at least 2 levels using a VAS       Start:  12/01/23    Expected End:  " 02/29/24

## 2024-01-22 ENCOUNTER — OFFICE VISIT (OUTPATIENT)
Dept: ORTHOPEDIC SURGERY | Facility: HOSPITAL | Age: 44
End: 2024-01-22
Payer: COMMERCIAL

## 2024-01-22 DIAGNOSIS — M95.8 OSTEOCHONDRAL DEFECT OF TALUS: Primary | ICD-10-CM

## 2024-01-22 DIAGNOSIS — M25.872 ANKLE IMPINGEMENT SYNDROME, LEFT: ICD-10-CM

## 2024-01-22 DIAGNOSIS — M76.72 PERONEAL TENDINITIS OF LEFT LOWER EXTREMITY: ICD-10-CM

## 2024-01-22 DIAGNOSIS — M95.8 OSTEOCHONDRAL DEFECT OF TALUS: ICD-10-CM

## 2024-01-22 PROCEDURE — 99024 POSTOP FOLLOW-UP VISIT: CPT | Performed by: ORTHOPAEDIC SURGERY

## 2024-01-22 PROCEDURE — 1036F TOBACCO NON-USER: CPT | Performed by: ORTHOPAEDIC SURGERY

## 2024-01-22 ASSESSMENT — PAIN - FUNCTIONAL ASSESSMENT: PAIN_FUNCTIONAL_ASSESSMENT: 0-10

## 2024-01-22 ASSESSMENT — PAIN SCALES - GENERAL: PAINLEVEL_OUTOF10: 3

## 2024-01-22 NOTE — PROGRESS NOTES
This is a pleasant 43 y.o. year old female who presents for fuv of  left ankle.  She is doing PT.  Feels good working on balance.  Using boot for longer distances with two crutches .  Working on weaning boot.    PHYSICAL EXAMINATION  Constitutional Exam: patient's height and weight reviewed, well-kempt  Psychiatric Exam: alert and oriented x 3, appropriate mood and behavior  Eye Exam: CHAPIN, EOMI  Pulmonary Exam: breathing non-labored, no apparent distress  Lymphatic exam: no appreciable lymphadenopathy in the lower extremities  Cardiovascular exam: DP pulses 2+ bilaterally, PT 2+ bilaterally, toes are pink with good capillary refill, no pitting edema  Skin exam: no open lesions, rashes, abrasions or ulcerations  Neurological exam: sensation to light touch intact in both lower extremities in peripheral and dermatomal distributions (except for any abnormalities noted in musculoskeletal exam)    Musculoskeletal exam: left ankle: full ankle ROM, ST ROM good, stable ankle ligamentous exam, motor strength much improved INV/EV/DF/PF    ASSESSMENT: s/p left ankle scope with drilling OCD medial talus and extensive debridement of tibiotalar spurs, ST scope with debridement 11/16/23  PLAN: Further treatment options discussed including continued PT.  She can start stationary bike and talked about progression to elliptical.  Updated PT rx given, fuv in 6-8 weeks.  The patient's questions were answered in detail.      Note dictated with [employer] software, completed without full type editing to avoid delay.

## 2024-01-24 ENCOUNTER — TREATMENT (OUTPATIENT)
Dept: PHYSICAL THERAPY | Facility: CLINIC | Age: 44
End: 2024-01-24
Payer: COMMERCIAL

## 2024-01-24 DIAGNOSIS — M95.8 OSTEOCHONDRAL DEFECT OF TALUS: ICD-10-CM

## 2024-01-24 DIAGNOSIS — M24.676 ANKYLOSIS OF SUBTALAR JOINT: ICD-10-CM

## 2024-01-24 DIAGNOSIS — M25.872 ANKLE IMPINGEMENT SYNDROME, LEFT: ICD-10-CM

## 2024-01-24 PROCEDURE — 97110 THERAPEUTIC EXERCISES: CPT | Mod: GP | Performed by: PHYSICAL THERAPIST

## 2024-01-24 NOTE — PROGRESS NOTES
"Physical Therapy Evaluation    Patient Name: Sai Scanlon  MRN: [id number]  Today's Date: 1/24/2024  Visit: 10  Referred by: Dr. Gu  Diagnosis:   1. Ankle impingement syndrome, left        2. Osteochondral defect of talus        Precautions: WBAT  Time in: 8:50  Time out: 9:30  SUBJECTIVE:  43 y.o. female s/p L) ankle scope for extensive debridement and subchondral drilling of medial talar and lateral tibial OCD, 11-16-23 by Dr. Gu.  Reports mild ache this morning 2-3/10.   Was up on her feet a lot yesterday.  HEP: Y    OBJECTIVE:  Ambulating well out of boot today.  She is wearing high top hiking shoes.  ROM L) ankle: DF- 17, PF- 55, EV- 20, IN- 35    Outcome Measure:  FAAM- 24%    ASSESSMENT:  Responded well to exercise today.  Tolerated increased loading of joint with ex's.    TREATMENT:  - Therex:   bike L7 x 5 min  Incline calf S 60\"   Standing  Fitter rockits 2way x 60\" ea.  BAPS bilat. Cw/ccw in stand  2x15 ea.   Step DF S 10\" x 5  4-pt sit back PF 10\" x 10  Seated calf raise 20# 3x10 ea.  Tband ankle INV,EV, DF > 3x15  G  TG mini-Leg Press L)  L7+ 1  3 x 10  Standing Cruz.  calf raises  3 x 15  Airex SLSB 30\" x 3  Airex target touches  3pt.  3x10  Airex step touches 3x20  2nd step  4 way Steamboats G  x10 ea.  Line heel toe tandem walk 15\" x 4  Sport cord walk outs retro x 10    PLAN:   Continue daily HEP.  F/U next week.    GOALS:  Active       PT Problem       Pt. will have improved pain free ROM to assist with improving functional tolerances       Start:  12/01/23    Expected End:  02/29/24            Pt will have improved LE strength to assist with improving functional tolerances       Start:  12/01/23    Expected End:  02/29/24            Pt. will have improved FAAM score by at lest 15%       Start:  12/01/23    Expected End:  02/29/24            Pt. will have improved tolerances for stairs, standing and walking       Start:  12/01/23    Expected End:  02/29/24            Pt. will be " independent with HEP       Start:  12/01/23    Expected End:  02/29/24            Pt will have decreased reports of pain by at least 2 levels using a VAS       Start:  12/01/23    Expected End:  02/29/24

## 2024-01-26 ENCOUNTER — APPOINTMENT (OUTPATIENT)
Dept: PHYSICAL THERAPY | Facility: CLINIC | Age: 44
End: 2024-01-26
Payer: COMMERCIAL

## 2024-01-26 NOTE — PROGRESS NOTES
"Physical Therapy Evaluation    Patient Name: Sai Scanlon  MRN: [id number]  Today's Date: 12/1/2023  Visit: 11  Referred by: Dr. Gu  Diagnosis:   1. Ankle impingement syndrome, left        2. Osteochondral defect of talus        Precautions: WBAT  Time in: 8:50  Time out: 9:30  SUBJECTIVE:  43 y.o. female s/p L) ankle scope for extensive debridement and subchondral drilling of medial talar and lateral tibial OCD, 11-16-23 by Dr. Gu.  Reports mild ache this morning 2-3/10.   Was up on her feet a lot yesterday.  HEP: Y    OBJECTIVE:  Ambulating well out of boot today.  She is wearing high top hiking shoes.  ROM L) ankle: DF- 17, PF- 55, EV- 20, IN- 35    Outcome Measure:  FAAM- 24%    ASSESSMENT:  Responded well to exercise today.  Tolerated increased loading of joint with ex's.    TREATMENT:  - Therex:   bike L7 x 5 min  Incline calf S 60\"   Standing  Fitter rockits 2way x 60\" ea.  BAPS bilat. Cw/ccw in stand  2x15 ea.   Step DF S 10\" x 5  4-pt sit back PF 10\" x 10  Seated calf raise 20# 3x10 ea.  Tband ankle INV,EV, DF > 3x15  G  TG mini-Leg Press L)  L7+ 1  3 x 10  Standing Cruz.  calf raises  3 x 15  Airex SLSB 30\" x 3  Airex target touches  3pt.  3x10  Airex step touches 3x20  2nd step  4 way Steamboats G  x10 ea.  Line heel toe tandem walk 15\" x 4  Sport cord walk outs retro x 10    PLAN:   Continue daily HEP.  F/U next week.    GOALS:  Active       PT Problem       Pt. will have improved pain free ROM to assist with improving functional tolerances       Start:  12/01/23    Expected End:  02/29/24            Pt will have improved LE strength to assist with improving functional tolerances       Start:  12/01/23    Expected End:  02/29/24            Pt. will have improved FAAM score by at lest 15%       Start:  12/01/23    Expected End:  02/29/24            Pt. will have improved tolerances for stairs, standing and walking       Start:  12/01/23    Expected End:  02/29/24            Pt. will be " independent with HEP       Start:  12/01/23    Expected End:  02/29/24            Pt will have decreased reports of pain by at least 2 levels using a VAS       Start:  12/01/23    Expected End:  02/29/24

## 2024-01-31 ENCOUNTER — TREATMENT (OUTPATIENT)
Dept: PHYSICAL THERAPY | Facility: CLINIC | Age: 44
End: 2024-01-31
Payer: COMMERCIAL

## 2024-01-31 DIAGNOSIS — M24.676 ANKYLOSIS OF SUBTALAR JOINT: ICD-10-CM

## 2024-01-31 DIAGNOSIS — M95.8 OSTEOCHONDRAL DEFECT OF TALUS: Primary | ICD-10-CM

## 2024-01-31 DIAGNOSIS — M76.72 PERONEAL TENDINITIS OF LEFT LOWER EXTREMITY: ICD-10-CM

## 2024-01-31 DIAGNOSIS — M25.872 ANKLE IMPINGEMENT SYNDROME, LEFT: ICD-10-CM

## 2024-01-31 PROCEDURE — 97110 THERAPEUTIC EXERCISES: CPT | Mod: GP | Performed by: PHYSICAL THERAPIST

## 2024-01-31 NOTE — PROGRESS NOTES
"Physical Therapy Evaluation    Patient Name: Sai Scanlon  MRN: [id number]  Today's Date: 1/31/2024  Visit: 11  Referred by: Dr. uG  Diagnosis:   1. Ankle impingement syndrome, left        2. Osteochondral defect of talus        Precautions: WBAT  Time in: 8:50  Time out: 9:30  SUBJECTIVE:  43 y.o. female s/p L) ankle scope for extensive debridement and subchondral drilling of medial talar and lateral tibial OCD, 11-16-23 by Dr. Gu.  Reports mild ache with prolonged WB and with stretching into PF.  HEP: Y    OBJECTIVE:  Ambulating without deviation today.  ROM L) ankle: DF- 17, PF- 55, EV- 20, IN- 35    Outcome Measure:  FAAM- 24%    ASSESSMENT:  Responded well to exercise today.  Tolerated graduated loading of joint with ex's well.    TREATMENT:  - Therex:   bike L7 x 5 min   NP today  Incline calf S 60\" 2 way  Standing  Fitter rockits 2way x 60\" ea.  BAPS bilat. Cw/ccw in stand  x20 ea.   Step DF S 10\" x 5  4-pt sit back PF 10\" x 10  Seated calf raise 20# 3x15 ea.  TG mini-Leg Press L)  L7+ 1.5  3 x 10  Standing Cruz.  calf raises  3 x 15  Airex step touches 3x20  2nd step  4 way Steamboats G  x10 ea.  Line heel toe tandem walk 15\" x 4  Line cross over stepping 15' x 2 ea.  Sport cord walk outs retro x 10, x 5 each lateral  Step ups 8\"  10#  3x10    PLAN:   Continue daily HEP.  F/U later this week.    GOALS:  Active       PT Problem       Pt. will have improved pain free ROM to assist with improving functional tolerances       Start:  12/01/23    Expected End:  02/29/24            Pt will have improved LE strength to assist with improving functional tolerances       Start:  12/01/23    Expected End:  02/29/24            Pt. will have improved FAAM score by at lest 15%       Start:  12/01/23    Expected End:  02/29/24            Pt. will have improved tolerances for stairs, standing and walking       Start:  12/01/23    Expected End:  02/29/24            Pt. will be independent with HEP       Start:  " 12/01/23    Expected End:  02/29/24            Pt will have decreased reports of pain by at least 2 levels using a VAS       Start:  12/01/23    Expected End:  02/29/24

## 2024-02-02 ENCOUNTER — TREATMENT (OUTPATIENT)
Dept: PHYSICAL THERAPY | Facility: CLINIC | Age: 44
End: 2024-02-02
Payer: COMMERCIAL

## 2024-02-02 DIAGNOSIS — M24.676 ANKYLOSIS OF SUBTALAR JOINT: ICD-10-CM

## 2024-02-02 DIAGNOSIS — M25.872 ANKLE IMPINGEMENT SYNDROME, LEFT: ICD-10-CM

## 2024-02-02 DIAGNOSIS — M76.72 PERONEAL TENDINITIS OF LEFT LOWER EXTREMITY: ICD-10-CM

## 2024-02-02 DIAGNOSIS — M95.8 OSTEOCHONDRAL DEFECT OF TALUS: Primary | ICD-10-CM

## 2024-02-02 PROCEDURE — 97110 THERAPEUTIC EXERCISES: CPT | Mod: GP | Performed by: PHYSICAL THERAPIST

## 2024-02-02 NOTE — PROGRESS NOTES
"Physical Therapy Treatment    Patient Name: Sai Scanlon  MRN: [id number]  Today's Date: 2/2/2024  Visit: 12  Referred by: Dr. Gu  Diagnosis:   1. Ankle impingement syndrome, left        2. Osteochondral defect of talus          SUBJECTIVE:  43 y.o. female s/p L) ankle scope for extensive debridement and subchondral drilling of medial talar and lateral tibial OCD, 11-16-23 by Dr. Gu.  Reports decreased ache in ankle today.  Still hurts with stretching into PF.  HEP: Y    OBJECTIVE:  Ambulating without deviation today.  ROM L) ankle: DF- 17, PF- 55, EV- 20, IN- 35  SLSB 20+ sec.    Outcome Measure:  FAAM- 24%    ASSESSMENT:  Responded well to exercise today.  Tolerated graduated loading of joint with ex's well.    TREATMENT:  - Therex:   Elliptical  L5 x 5 min     Incline calf S 60\" 2 way  Standing  Fitter rockits 2way x 60\" ea.  BAPS bilat. Cw/ccw in stand  x20 ea.   Step DF S 10\" x 5  4-pt sit back PF 10\" x 10  Seated L) calf raise 25# 3x15 ea.  TG mini-Leg Press L)  L7+ 2  3 x 10  Standing Cruz.  calf raises  3 x 20  Airex step touches 3x20  2nd step  4 way Steamboats G  2x10 ea.  Line heel toe tandem walk 15\" x 8  Line cross over stepping 15' x 4 ea.  Sport cord walk outs retro x 10, x 5 each lateral  Step ups 8\"  15#  3x10    PLAN:   Continue daily HEP.  F/U next week.    GOALS:  Active       PT Problem       Pt. will have improved pain free ROM to assist with improving functional tolerances       Start:  12/01/23    Expected End:  02/29/24            Pt will have improved LE strength to assist with improving functional tolerances       Start:  12/01/23    Expected End:  02/29/24            Pt. will have improved FAAM score by at lest 15%       Start:  12/01/23    Expected End:  02/29/24            Pt. will have improved tolerances for stairs, standing and walking       Start:  12/01/23    Expected End:  02/29/24            Pt. will be independent with HEP       Start:  12/01/23    Expected End:  " 02/29/24            Pt will have decreased reports of pain by at least 2 levels using a VAS       Start:  12/01/23    Expected End:  02/29/24

## 2024-02-06 ENCOUNTER — TREATMENT (OUTPATIENT)
Dept: PHYSICAL THERAPY | Facility: CLINIC | Age: 44
End: 2024-02-06
Payer: COMMERCIAL

## 2024-02-06 DIAGNOSIS — M24.676 ANKYLOSIS OF SUBTALAR JOINT: ICD-10-CM

## 2024-02-06 DIAGNOSIS — M25.872 ANKLE IMPINGEMENT SYNDROME, LEFT: ICD-10-CM

## 2024-02-06 DIAGNOSIS — M95.8 OSTEOCHONDRAL DEFECT OF TALUS: Primary | ICD-10-CM

## 2024-02-06 DIAGNOSIS — M76.72 PERONEAL TENDINITIS OF LEFT LOWER EXTREMITY: ICD-10-CM

## 2024-02-06 PROCEDURE — 97110 THERAPEUTIC EXERCISES: CPT | Mod: GP | Performed by: PHYSICAL THERAPIST

## 2024-02-06 NOTE — PROGRESS NOTES
"Physical Therapy Treatment    Patient Name: Sai Scanlon  MRN: [id number]  Today's Date: 2/6/2024  Visit: 13  Referred by: Dr. Gu  Diagnosis:   1. Ankle impingement syndrome, left        2. Osteochondral defect of talus          SUBJECTIVE:  43 y.o. female s/p L) ankle scope for extensive debridement and subchondral drilling of medial talar and lateral tibial OCD, 11-16-23 by Dr. Gu.    She continues to perform HEP regularly and reports improving function with less pain.    OBJECTIVE:  Pt. Able to perform SL calf raises x 15.  Ambulating without deviation today.  ROM L) ankle: DF- 17, PF- 55, EV- 20, IN- 35  SLSB 20+ sec.    Outcome Measure:  FAAM- 24%    ASSESSMENT:  Responded well to exercise today.  Tolerated graduated loading of joint with ex's well.    TREATMENT:  - Therex:   Elliptical  L5 x 10 min     Incline calf S 60\" 2 way  Standing  Fitter rockits 2way x 60\" ea.  BAPS bilat. Cw/ccw in stand  x20 ea.   Step DF S 10\" x 5  4-pt sit back PF 10\" x 10  Seated L) calf raise 25# 3x15 ea.  TG mini-Leg Press L)  L7+ 2  3 x 10  1/2 roll DF lifts 3x20  raised Cruz.  calf raises  3 x 20  Airex step touches 3x20  2nd step  4 way Steamboats G  2x10 ea.  Line heel toe tandem walk 15\" x 8  Line cross over stepping 15' x 4 ea.  Sport cord walk outs retro x 10, x 5 each lateral  Step ups 8\"  15#  3x10    PLAN:   Continue daily HEP.  F/U later this week.    GOALS:  Active       PT Problem       Pt. will have improved pain free ROM to assist with improving functional tolerances       Start:  12/01/23    Expected End:  02/29/24            Pt will have improved LE strength to assist with improving functional tolerances       Start:  12/01/23    Expected End:  02/29/24            Pt. will have improved FAAM score by at lest 15%       Start:  12/01/23    Expected End:  02/29/24            Pt. will have improved tolerances for stairs, standing and walking       Start:  12/01/23    Expected End:  02/29/24            Pt. " will be independent with HEP       Start:  12/01/23    Expected End:  02/29/24            Pt will have decreased reports of pain by at least 2 levels using a VAS       Start:  12/01/23    Expected End:  02/29/24

## 2024-02-09 ENCOUNTER — TREATMENT (OUTPATIENT)
Dept: PHYSICAL THERAPY | Facility: CLINIC | Age: 44
End: 2024-02-09
Payer: COMMERCIAL

## 2024-02-09 DIAGNOSIS — M25.872 ANKLE IMPINGEMENT SYNDROME, LEFT: ICD-10-CM

## 2024-02-09 DIAGNOSIS — M76.72 PERONEAL TENDINITIS OF LEFT LOWER EXTREMITY: ICD-10-CM

## 2024-02-09 DIAGNOSIS — M95.8 OSTEOCHONDRAL DEFECT OF TALUS: Primary | ICD-10-CM

## 2024-02-09 DIAGNOSIS — M24.676 ANKYLOSIS OF SUBTALAR JOINT: ICD-10-CM

## 2024-02-09 PROCEDURE — 97110 THERAPEUTIC EXERCISES: CPT | Mod: GP | Performed by: PHYSICAL THERAPIST

## 2024-02-09 NOTE — PROGRESS NOTES
"Physical Therapy Treatment    Patient Name: Sai Scanlon  MRN: [id number]  Today's Date: 2/9/2024  Visit: 14  Referred by: Dr. Gu  Diagnosis:   1. Ankle impingement syndrome, left        2. Osteochondral defect of talus          SUBJECTIVE:  43 y.o. female s/p L) ankle scope for extensive debridement and subchondral drilling of medial talar and lateral tibial OCD, 11-16-23 by Dr. Gu.    She continues to perform HEP regularly and reports improving function with less pain.    OBJECTIVE:  No deviation with  ambulating today.  ROM L) ankle: DF- 17, PF- 55, EV- 20, IN- 35  SLSB 20+ sec.    Outcome Measure:  FAAM- 24%    ASSESSMENT:  Responded well to exercise with out pain reported after session.  She is progressing well and will be probably discharged in 2 weeks.     TREATMENT:  - Therex:   Elliptical  L6 x 10 min     Incline calf S 60\" 2 way  BAPS bilat. Cw/ccw in stand L5  x20 ea.   Step DF S 10\" x 5  4-pt sit back PF 10\" x 10  Seated L) calf raise 30# 3x15 ea.  TG mini-Leg Press L)  L7+ 2  3 x 15  1/2 roll DF lifts 3x20  raised Cruz.  calf raises  3 x 20  Airex step touches 3x20  2nd step  4 way Steamboats blk  2x10 ea.  Line cross over stepping 15' x 4 ea.  Sport cord walk outs retro x 10, x 10 each lateral  Step ups 8\"  20#  3x10    PLAN:   Continue daily HEP.  F/U next week.    GOALS:  Active       PT Problem       Pt. will have improved pain free ROM to assist with improving functional tolerances       Start:  12/01/23    Expected End:  02/29/24            Pt will have improved LE strength to assist with improving functional tolerances       Start:  12/01/23    Expected End:  02/29/24            Pt. will have improved FAAM score by at lest 15%       Start:  12/01/23    Expected End:  02/29/24            Pt. will have improved tolerances for stairs, standing and walking       Start:  12/01/23    Expected End:  02/29/24            Pt. will be independent with HEP       Start:  12/01/23    Expected End:  " 02/29/24            Pt will have decreased reports of pain by at least 2 levels using a VAS       Start:  12/01/23    Expected End:  02/29/24

## 2024-02-13 ENCOUNTER — APPOINTMENT (OUTPATIENT)
Dept: PHYSICAL THERAPY | Facility: CLINIC | Age: 44
End: 2024-02-13
Payer: COMMERCIAL

## 2024-02-14 ENCOUNTER — TREATMENT (OUTPATIENT)
Dept: PHYSICAL THERAPY | Facility: CLINIC | Age: 44
End: 2024-02-14
Payer: COMMERCIAL

## 2024-02-14 DIAGNOSIS — M25.872 ANKLE IMPINGEMENT SYNDROME, LEFT: ICD-10-CM

## 2024-02-14 DIAGNOSIS — M24.676 ANKYLOSIS OF SUBTALAR JOINT: ICD-10-CM

## 2024-02-14 DIAGNOSIS — M95.8 OSTEOCHONDRAL DEFECT OF TALUS: Primary | ICD-10-CM

## 2024-02-14 DIAGNOSIS — M76.72 PERONEAL TENDINITIS OF LEFT LOWER EXTREMITY: ICD-10-CM

## 2024-02-14 PROCEDURE — 97110 THERAPEUTIC EXERCISES: CPT | Mod: GP | Performed by: PHYSICAL THERAPIST

## 2024-02-14 NOTE — PROGRESS NOTES
"Physical Therapy Treatment    Patient Name: Sai Scanlon  MRN: [id number]  Today's Date: 2/14/2024  Visit: 15  Referred by: Dr. Gu  Diagnosis:   1. Ankle impingement syndrome, left        2. Osteochondral defect of talus          SUBJECTIVE:  43 y.o. female s/p L) ankle scope for extensive debridement and subchondral drilling of medial talar and lateral tibial OCD, 11-16-23 by Dr. Gu.    She states that over the past week she has had some medial and lateral ankle ache off and on.    OBJECTIVE:  No deviation with  ambulating today.  Full AROM of ankle with mild ache on end range PF and inversion.  Resisted movements of ankle are strong and painless.  SLSB 20+ sec.    Outcome Measure:  FAAM- 24%    ASSESSMENT:  Responded well to exercise with out increased ache reported after session.      TREATMENT:  - Therex:   Elliptical  L6 x 10 min     Incline calf S 60\" 2 way  BAPS bilat. Cw/ccw in stand L5  x20 ea.   Step DF S 10\" x 5  4-pt sit back PF 10\" x 10  Seated L) calf raise 30# 3x15 ea.  1/2 roll DF lifts 3x20  raised Cruz.  calf raises  3 x 20  Airex step touches 3x20  2nd step  Airex SLSB  x30\" x 3  4 way Steamboats blk  2x10 ea.  TG mini-Leg Press L)  L7+ 2  3 x 15   Line cross over stepping 15' x 5 ea.  Sport cord walk outs retro x 10, x 10 each lateral  NP  Step ups 8\"  20#  3x10    PLAN:   Continue daily HEP.  F/U next week.    GOALS:  Active       PT Problem       Pt. will have improved pain free ROM to assist with improving functional tolerances       Start:  12/01/23    Expected End:  02/29/24            Pt will have improved LE strength to assist with improving functional tolerances       Start:  12/01/23    Expected End:  02/29/24            Pt. will have improved FAAM score by at lest 15%       Start:  12/01/23    Expected End:  02/29/24            Pt. will have improved tolerances for stairs, standing and walking       Start:  12/01/23    Expected End:  02/29/24            Pt. will be independent " with HEP       Start:  12/01/23    Expected End:  02/29/24            Pt will have decreased reports of pain by at least 2 levels using a VAS       Start:  12/01/23    Expected End:  02/29/24

## 2024-02-16 ENCOUNTER — TREATMENT (OUTPATIENT)
Dept: PHYSICAL THERAPY | Facility: CLINIC | Age: 44
End: 2024-02-16
Payer: COMMERCIAL

## 2024-02-16 DIAGNOSIS — M25.872 ANKLE IMPINGEMENT SYNDROME, LEFT: ICD-10-CM

## 2024-02-16 DIAGNOSIS — M24.676 ANKYLOSIS OF SUBTALAR JOINT: ICD-10-CM

## 2024-02-16 DIAGNOSIS — M95.8 OSTEOCHONDRAL DEFECT OF TALUS: ICD-10-CM

## 2024-02-16 PROCEDURE — 97110 THERAPEUTIC EXERCISES: CPT | Mod: GP | Performed by: PHYSICAL THERAPIST

## 2024-02-16 NOTE — PROGRESS NOTES
"Physical Therapy Treatment    Patient Name: Sai Scanlon  MRN: [id number]  Today's Date: 2/16/2024  Visit: 16  Referred by: Dr. Gu  Diagnosis:   1. Ankle impingement syndrome, left        2. Osteochondral defect of talus          SUBJECTIVE:  43 y.o. female s/p L) ankle scope for extensive debridement and subchondral drilling of medial talar and lateral tibial OCD, 11-16-23 by Dr. Gu.    She states that she has continued to have some medial and lateral ankle ache.  States that she had to use crutches because of it the evening of last session.    OBJECTIVE:  No deviation with  ambulating today.  Full AROM of ankle with mild a\"strain\" with end range PF and inversion.  Resisted movements of ankle are strong and painless.  SLSB 20+ sec.    Outcome Measure:  FAAM- 24%    ASSESSMENT:  Pt. With increased ache  past week or so. Secondary to this we have cut back joint loading and will see how it responds.  No increased ache reported today.    TREATMENT:  - Therex:  Upright bike L6 x 5 min     Incline calf S 60\" 2 way  BAPS bilat. Cw/ccw in stand L5  x20 ea.   Step DF S 10\" x 5  4-pt sit back PF 10\" x 10  Seated L) calf raise 30# 3x15 ea.  NP  1/2 roll DF lifts 3x20  raised Cruz.  calf raises  3 x 20  Airex step touches 3x20  2nd step  Airex SLSB  x30\" x 3  4 way Steamboats blk  2x10 ea.  TG mini-Leg Press L)  L7+ 2  3 x 15 NP  Line cross over stepping 15' x 5 ea. NP  Sport cord walk outs retro x 10, x 10 each lateral  NP  Step ups 8\"  20#  3x10   NP     Vasopneumatic cryotherapy @ 34* F x 10 min. L) ankle.    PLAN:   Continue daily HEP.  Will have her decreased ADL loads also.  ICE as needed.  F/U next week.    GOALS:  Active       PT Problem       Pt. will have improved pain free ROM to assist with improving functional tolerances       Start:  12/01/23    Expected End:  02/29/24            Pt will have improved LE strength to assist with improving functional tolerances       Start:  12/01/23    Expected End:  " 02/29/24            Pt. will have improved FAAM score by at lest 15%       Start:  12/01/23    Expected End:  02/29/24            Pt. will have improved tolerances for stairs, standing and walking       Start:  12/01/23    Expected End:  02/29/24            Pt. will be independent with HEP       Start:  12/01/23    Expected End:  02/29/24            Pt will have decreased reports of pain by at least 2 levels using a VAS       Start:  12/01/23    Expected End:  02/29/24

## 2024-02-20 ENCOUNTER — TREATMENT (OUTPATIENT)
Dept: PHYSICAL THERAPY | Facility: CLINIC | Age: 44
End: 2024-02-20
Payer: COMMERCIAL

## 2024-02-20 DIAGNOSIS — M24.676 ANKYLOSIS OF SUBTALAR JOINT: ICD-10-CM

## 2024-02-20 DIAGNOSIS — M25.872 ANKLE IMPINGEMENT SYNDROME, LEFT: ICD-10-CM

## 2024-02-20 DIAGNOSIS — M95.8 OSTEOCHONDRAL DEFECT OF TALUS: ICD-10-CM

## 2024-02-20 PROCEDURE — 97110 THERAPEUTIC EXERCISES: CPT | Mod: GP | Performed by: PHYSICAL THERAPIST

## 2024-02-20 NOTE — PROGRESS NOTES
"Physical Therapy Treatment    Patient Name: Sai Scanlon  MRN: [id number]  Today's Date: 2/20/2024  Visit: 16  Referred by: Dr. Gu  Diagnosis:   1. Ankle impingement syndrome, left        2. Osteochondral defect of talus          SUBJECTIVE:  43 y.o. female s/p L) ankle scope for extensive debridement and subchondral drilling of medial talar and lateral tibial OCD, 11-16-23 by Dr. Gu.    She states that she has continued to have some medial and lateral ankle ache.  However, it less than a week ago.  0-3/10.      OBJECTIVE:  No deviation with  ambulating today.  Full AROM of ankle with mild a\"strain\" with end range PF and inversion.  Resisted movements of ankle are strong and painless.    Outcome Measure:  FAAM- 24%    ASSESSMENT:  She reports ache has been better this week.  Was moved comfortably throughout session and no increased ache reported today.    TREATMENT:  - Therex:  Upright bike L6 x 5 min     Incline calf S 60\" 2 way  BAPS bilat. Cw/ccw in stand L5  x20 ea.   Step DF S 10\" x 5  4-pt sit back PF 10\" x 10  Seated L) calf raise 30# 3x15 ea.  NP  1/2 roll DF lifts 3x20  raised Cruz.  calf raises  3 x 20  Airex step touches 3x20  2nd step  Airex SLSB  x30\" x 3  4 way Steamboats blk  3x10 ea.  TG mini-Leg Press L)  L7+ 2  3 x 15   Line cross over stepping 15' x 5 ea.     Vasopneumatic cryotherapy @ 34* F x 10 min. L) ankle.    PLAN:   Continue daily HEP.  ICE as needed.  F/U next week.    GOALS:  Active       PT Problem       Pt. will have improved pain free ROM to assist with improving functional tolerances       Start:  12/01/23    Expected End:  02/29/24            Pt will have improved LE strength to assist with improving functional tolerances       Start:  12/01/23    Expected End:  02/29/24            Pt. will have improved FAAM score by at lest 15%       Start:  12/01/23    Expected End:  02/29/24            Pt. will have improved tolerances for stairs, standing and walking       Start:  " 12/01/23    Expected End:  02/29/24            Pt. will be independent with HEP       Start:  12/01/23    Expected End:  02/29/24            Pt will have decreased reports of pain by at least 2 levels using a VAS       Start:  12/01/23    Expected End:  02/29/24

## 2024-02-23 ENCOUNTER — TREATMENT (OUTPATIENT)
Dept: PHYSICAL THERAPY | Facility: CLINIC | Age: 44
End: 2024-02-23
Payer: COMMERCIAL

## 2024-02-23 DIAGNOSIS — M24.676 ANKYLOSIS OF SUBTALAR JOINT: ICD-10-CM

## 2024-02-23 DIAGNOSIS — M25.872 ANKLE IMPINGEMENT SYNDROME, LEFT: ICD-10-CM

## 2024-02-23 DIAGNOSIS — M95.8 OSTEOCHONDRAL DEFECT OF TALUS: ICD-10-CM

## 2024-02-23 PROCEDURE — 97110 THERAPEUTIC EXERCISES: CPT | Mod: GP,CQ | Performed by: PHYSICAL THERAPY ASSISTANT

## 2024-02-23 NOTE — PROGRESS NOTES
"Physical Therapy Treatment    Patient Name: Sai Scanlon  MRN: [id number]  Today's Date: 12/1/2023  Visit: 17  Referred by: Dr. Gu  Diagnosis:   1. Ankle impingement syndrome, left        2. Osteochondral defect of talus          SUBJECTIVE:  43 y.o. female s/p L) ankle scope for extensive debridement and subchondral drilling of medial talar and lateral tibial OCD, 11-16-23 by Dr. Gu.      Pt enters into therapy reporting no pain or achiness as previously mentioned. She states that she hasn't been as consistent with HEP over the last week which may have contributed to less irritation.       OBJECTIVE:  Independent w/o UE support while performing streamboats.   Outcome Measure:  FAAM- 24%    ASSESSMENT:  No pain with session. Adapting well to work load. Consistently making steady progressions toward goals. Able to stand and walk for longer periods w/o high pain grade. Independent and compliant to HEP.      TREATMENT:  - Therex:  Upright bike L7 x 5 min     Incline calf S 60\" 2 way  BAPS bilat. Cw/ccw in stand L5  x20 ea.   Step DF S 10\" x 5  4-pt sit back PF 10\" x 10  Seated L) calf raise 30# 3x15 ea.  NP  1/2 roll DF lifts 3x20  raised Cruz.  calf raises  3 x 20  Airex step touches 3x10  2nd step  Airex SLSB  x30\" x 3  4 way Steamboats blk  3x10 ea.  TG mini-Leg Press L)  L7+ 2  3 x 15   Line cross over stepping 15' x 5 ea.         PLAN:   Continue daily HEP.  ICE as needed.  F/U next week.    GOALS:  Active       PT Problem       Pt. will have improved pain free ROM to assist with improving functional tolerances       Start:  12/01/23    Expected End:  02/29/24            Pt will have improved LE strength to assist with improving functional tolerances       Start:  12/01/23    Expected End:  02/29/24            Pt. will have improved FAAM score by at lest 15%       Start:  12/01/23    Expected End:  02/29/24            Pt. will have improved tolerances for stairs, standing and walking       Start:  " 12/01/23    Expected End:  02/29/24            Pt. will be independent with HEP       Start:  12/01/23    Expected End:  02/29/24            Pt will have decreased reports of pain by at least 2 levels using a VAS       Start:  12/01/23    Expected End:  02/29/24

## 2024-02-28 ENCOUNTER — TREATMENT (OUTPATIENT)
Dept: PHYSICAL THERAPY | Facility: CLINIC | Age: 44
End: 2024-02-28
Payer: COMMERCIAL

## 2024-02-28 DIAGNOSIS — M25.872 ANKLE IMPINGEMENT SYNDROME, LEFT: ICD-10-CM

## 2024-02-28 DIAGNOSIS — M76.72 PERONEAL TENDINITIS OF LEFT LOWER EXTREMITY: ICD-10-CM

## 2024-02-28 DIAGNOSIS — M95.8 OSTEOCHONDRAL DEFECT OF TALUS: ICD-10-CM

## 2024-02-28 PROCEDURE — 97110 THERAPEUTIC EXERCISES: CPT | Mod: GP | Performed by: PHYSICAL THERAPIST

## 2024-02-28 NOTE — PROGRESS NOTES
"Physical Therapy Treatment    Patient Name: Sai Scanlon  MRN: [id number]  Today's Date: 2/28/2024  Visit: 18  Referred by: Dr. Gu  Diagnosis:   1. Ankle impingement syndrome, left        2. Osteochondral defect of talus          SUBJECTIVE:  43 y.o. female s/p L) ankle scope for extensive debridement and subchondral drilling of medial talar and lateral tibial OCD, 11-16-23 by Dr. Gu.    She states that she continues to have some ankle ache after prolonged WB activities. 0-3/10.      OBJECTIVE:  No deviation with  ambulating today.  Full AROM of ankle with mild a\"strain\" with end range PF and inversion.  Resisted movements of ankle are strong and painless.    Outcome Measure:  FAAM- 24%    ASSESSMENT:  She was more comfortably throughout session and no increased ache reported today.  She will see Dr. Gu Monday.    TREATMENT:  - Therex:  Upright bike L6 x 5 min     Incline calf S 60\" 2 way  BAPS bilat. Cw/ccw in stand L5  x20 ea.   Step DF S 10\" x 5  4-pt sit back PF 10\" x 6  Seated L) calf raise 30# 3x15 ea.  NP  1/2 roll DF lifts 3x20  raised Cruz.  calf raises  3 x 20  Airex step touches 3x20  2nd step  Airex SLSB  x30\" x 3  4 way Steamboats blk  3x10 ea.  TG mini-Leg Press L)  L7+ 2.5  3 x 15   Line cross over stepping 15' x 5 ea.     Vasopneumatic cryotherapy @ 34* F x 10 min. L) ankle.    PLAN:   Continue daily HEP.  ICE as needed.  She will see Dr. Gu on Monday.  Will await her opinion concerning progression of PT.    GOALS:  Active       PT Problem       Pt. will have improved pain free ROM to assist with improving functional tolerances       Start:  12/01/23    Expected End:  02/29/24            Pt will have improved LE strength to assist with improving functional tolerances       Start:  12/01/23    Expected End:  02/29/24            Pt. will have improved FAAM score by at lest 15%       Start:  12/01/23    Expected End:  02/29/24            Pt. will have improved tolerances for " stairs, standing and walking       Start:  12/01/23    Expected End:  02/29/24            Pt. will be independent with HEP       Start:  12/01/23    Expected End:  02/29/24            Pt will have decreased reports of pain by at least 2 levels using a VAS       Start:  12/01/23    Expected End:  02/29/24

## 2024-03-04 ENCOUNTER — TREATMENT (OUTPATIENT)
Dept: PHYSICAL THERAPY | Facility: CLINIC | Age: 44
End: 2024-03-04
Payer: COMMERCIAL

## 2024-03-04 ENCOUNTER — OFFICE VISIT (OUTPATIENT)
Dept: ORTHOPEDIC SURGERY | Facility: HOSPITAL | Age: 44
End: 2024-03-04
Payer: COMMERCIAL

## 2024-03-04 DIAGNOSIS — M95.8 OSTEOCHONDRAL DEFECT OF TALUS: Primary | ICD-10-CM

## 2024-03-04 DIAGNOSIS — M24.676 ANKYLOSIS OF SUBTALAR JOINT: ICD-10-CM

## 2024-03-04 DIAGNOSIS — M25.872 ANKLE IMPINGEMENT SYNDROME, LEFT: ICD-10-CM

## 2024-03-04 DIAGNOSIS — M76.72 PERONEAL TENDINITIS OF LEFT LOWER EXTREMITY: ICD-10-CM

## 2024-03-04 PROCEDURE — 97110 THERAPEUTIC EXERCISES: CPT | Mod: GP | Performed by: PHYSICAL THERAPIST

## 2024-03-04 PROCEDURE — 1036F TOBACCO NON-USER: CPT | Performed by: ORTHOPAEDIC SURGERY

## 2024-03-04 PROCEDURE — 99024 POSTOP FOLLOW-UP VISIT: CPT | Performed by: ORTHOPAEDIC SURGERY

## 2024-03-04 ASSESSMENT — PAIN DESCRIPTION - DESCRIPTORS: DESCRIPTORS: SHOOTING

## 2024-03-04 ASSESSMENT — PAIN SCALES - GENERAL: PAINLEVEL_OUTOF10: 6

## 2024-03-04 ASSESSMENT — PAIN - FUNCTIONAL ASSESSMENT: PAIN_FUNCTIONAL_ASSESSMENT: 0-10

## 2024-03-04 NOTE — PROGRESS NOTES
"Physical Therapy Treatment    Patient Name: Sai Scanlon  MRN: [id number]  Today's Date: 3/4/2024  Visit: 19  Referred by: Dr. Gu  Diagnosis:   1. Ankle impingement syndrome, left        2. Osteochondral defect of talus          SUBJECTIVE:  43 y.o. female s/p L) ankle scope for extensive debridement and subchondral drilling of medial talar and lateral tibial OCD, 11-16-23 by Dr. Gu.  She saw her surgeon this morning and she was told that she has has been over doing her prolonged standing and walking every day.  She states that she continues to have some ankle ache after prolonged WB activities. 0-3/10.      OBJECTIVE:  No deviation with  ambulating today.  Full AROM of ankle with mild a\"strain\" with end range PF and inversion.  Resisted movements of ankle are strong and painless.    Outcome Measure:  FAAM- 24%    ASSESSMENT:  She was more comfortably throughout session and no increased ache reported today.     TREATMENT:  - Therex:  Upright bike L6 x 5 min     Incline calf S 60\" 2 way  BAPS bilat. Cw/ccw in stand L5  x30 ea.   Step DF S 10\" x 5  4-pt sit back PF 10\" x 6  Seated L) calf raise 30# 3x15 ea.  NP  1/2 roll DF lifts 3x20  raised Cruz.  calf raises  3 x 20  Airex step touches 3x20  2nd step  Airex SLSB  x30\" x 3  4 way Steamboats blk  3x10 ea.  Airex @ 1/2 roll \"U\" tandem balance x 30 ea.  BOSU balance knees straight and flexed x 30\" ea.  TG mini-Leg Press L)  L7+ 3  3 x 15   Line cross over stepping 15' x 5 ea.     PLAN:   Continue daily HEP.  ICE as needed.  Will cut down to weekly PT with patient exercising independently on other days.  Discussed again with patient that aerobic options are biking and elliptical to avoid prolonged walking as much as possible.      GOALS:  Active       PT Problem       Pt. will have improved pain free ROM to assist with improving functional tolerances       Start:  12/01/23    Expected End:  02/29/24            Pt will have improved LE strength to assist with " improving functional tolerances       Start:  12/01/23    Expected End:  02/29/24            Pt. will have improved FAAM score by at lest 15%       Start:  12/01/23    Expected End:  02/29/24            Pt. will have improved tolerances for stairs, standing and walking       Start:  12/01/23    Expected End:  02/29/24            Pt. will be independent with HEP       Start:  12/01/23    Expected End:  02/29/24            Pt will have decreased reports of pain by at least 2 levels using a VAS       Start:  12/01/23    Expected End:  02/29/24

## 2024-03-04 NOTE — PROGRESS NOTES
This is a pleasant 44 y.o. year old female who presents for fuv of  left ankle and hindfoot.  She is doing PT twice a week.  She walks about 5 miles a day doing errands and also picking out materials for house that they are building.  She notes soreness at end of day over achilles tendon and peroneals.   No deep catching or locking.  At times medial malleolar soreness but none today.    PHYSICAL EXAMINATION  Constitutional Exam: patient's height and weight reviewed, well-kempt  Psychiatric Exam: alert and oriented x 3, appropriate mood and behavior  Eye Exam: CHAPIN, EOMI  Pulmonary Exam: breathing non-labored, no apparent distress  Lymphatic exam: no appreciable lymphadenopathy in the lower extremities  Cardiovascular exam: DP pulses 2+ bilaterally, PT 2+ bilaterally, toes are pink with good capillary refill, no pitting edema  Skin exam: no open lesions, rashes, abrasions or ulcerations  Neurological exam: sensation to light touch intact in both lower extremities in peripheral and dermatomal distributions (except for any abnormalities noted in musculoskeletal exam)    Musculoskeletal exam: left foot and ankle: no pain on palpation over medial malleolus, no ankle effusion, full ankle and ST ROM, strength DF/PF/INV/EV improving, wounds healed well, no point tenderness over achilles or peroneals today but gets some proximal achilles pain at times, no talar crepitus    ASSESSMENT: hx of left ankle drilling ocd medial talus, left subtalar scope with debridement, peroneal tendinoscopy 11/16/23  PLAN: Further treatment options discussed including continuing PT.  IF she can decrease the walking and standing or have recovery days (hard to continue to do 5 miles every day wrt errands and house related activities without break causing tendinitis to occur along achilles and peroneals).  She does not appear to have a medial malleolar stress fracture as no swelling in that location and nontender to palpation and she notes only  occasional intermittent pain.  PT orders updated.  Discussed starting exercise bike, pool walking or jogging or elliptical for cardio, lower impact options.  Her muscle girth is better but still needs work, encouraged her to continue strengthening.  Use of voltaren gel over tendinitis areas is ok, or use of advil.  FUV 2-3 months.  Discussed with her that cartilage defect prolongs recovery process but she appears to be doing well and is walking quite a bit already.  The patient's questions were answered in detail.      Note dictated with AVIS software, completed without full type editing to avoid delay.

## 2024-03-07 ENCOUNTER — APPOINTMENT (OUTPATIENT)
Dept: PHYSICAL THERAPY | Facility: CLINIC | Age: 44
End: 2024-03-07
Payer: COMMERCIAL

## 2024-03-11 ENCOUNTER — TREATMENT (OUTPATIENT)
Dept: PHYSICAL THERAPY | Facility: CLINIC | Age: 44
End: 2024-03-11
Payer: COMMERCIAL

## 2024-03-11 DIAGNOSIS — M76.72 PERONEAL TENDINITIS OF LEFT LOWER EXTREMITY: ICD-10-CM

## 2024-03-11 DIAGNOSIS — M95.8 OSTEOCHONDRAL DEFECT OF TALUS: Primary | ICD-10-CM

## 2024-03-11 DIAGNOSIS — M25.872 ANKLE IMPINGEMENT SYNDROME, LEFT: ICD-10-CM

## 2024-03-11 DIAGNOSIS — M24.676 ANKYLOSIS OF SUBTALAR JOINT: ICD-10-CM

## 2024-03-11 PROCEDURE — 97110 THERAPEUTIC EXERCISES: CPT | Mod: GP | Performed by: PHYSICAL THERAPIST

## 2024-03-11 NOTE — PROGRESS NOTES
"Physical Therapy Treatment    Patient Name: Sai Scanlon  MRN: [id number]  Today's Date: 3/11/2024  Visit: 20  Referred by: Dr. Gu  Diagnosis:   1. Ankle impingement syndrome, left        2. Osteochondral defect of talus          SUBJECTIVE:  43 y.o. female s/p L) ankle scope for extensive debridement and subchondral drilling of medial talar and lateral tibial OCD, 11-16-23 by Dr. Gu.    She states that she has been trying to over do her WB activities and the pain has been improved, 0-3/10.      OBJECTIVE:  No deviation with  ambulating today.  Full AROM of ankle with mild a\"strain\" with end range PF and inversion.  Resisted movements of ankle are strong and painless.    Outcome Measure:  FAAM- 24%    ASSESSMENT:  She continued to be comfortable throughout session and no increased ache reported today.     TREATMENT:  - Therex:  Upright bike L6 x 5 min     Incline calf S 60\" 2 way  BAPS bilat. Cw/ccw in stand L5  x30 ea.   Step DF S 10\" x 5  4-pt sit back PF 10\" x 6  Seated L) calf raise 30# 3x10 ea.    1/2 roll DF lifts 3x20  raised Cruz.  calf raises  3 x 20  Airex step touches 3x20  2nd step  Airex SLSB  x30\" x 3  4 way Steamboats blk  3x10 ea.  Airex @ 1/2 roll \"U\" tandem balance x 30\" x 2 ea.  TG mini-Leg Press L)  L7+ 3  3 x 15   Line cross over stepping 15' x 5 ea.     PLAN:   Continue daily HEP.  ICE as needed.  F/U next week.      GOALS:  Active       PT Problem       Pt. will have improved pain free ROM to assist with improving functional tolerances       Start:  12/01/23    Expected End:  02/29/24            Pt will have improved LE strength to assist with improving functional tolerances       Start:  12/01/23    Expected End:  02/29/24            Pt. will have improved FAAM score by at lest 15%       Start:  12/01/23    Expected End:  02/29/24            Pt. will have improved tolerances for stairs, standing and walking       Start:  12/01/23    Expected End:  02/29/24            Pt. will be " independent with HEP       Start:  12/01/23    Expected End:  02/29/24            Pt will have decreased reports of pain by at least 2 levels using a VAS       Start:  12/01/23    Expected End:  02/29/24

## 2024-03-13 ENCOUNTER — APPOINTMENT (OUTPATIENT)
Dept: PHYSICAL THERAPY | Facility: CLINIC | Age: 44
End: 2024-03-13
Payer: COMMERCIAL

## 2024-03-19 ENCOUNTER — TREATMENT (OUTPATIENT)
Dept: PHYSICAL THERAPY | Facility: CLINIC | Age: 44
End: 2024-03-19
Payer: COMMERCIAL

## 2024-03-19 DIAGNOSIS — M76.72 PERONEAL TENDINITIS OF LEFT LOWER EXTREMITY: Primary | ICD-10-CM

## 2024-03-19 DIAGNOSIS — M24.676 ANKYLOSIS OF SUBTALAR JOINT: ICD-10-CM

## 2024-03-19 DIAGNOSIS — M95.8 OSTEOCHONDRAL DEFECT OF TALUS: ICD-10-CM

## 2024-03-19 DIAGNOSIS — M25.872 ANKLE IMPINGEMENT SYNDROME, LEFT: ICD-10-CM

## 2024-03-19 PROCEDURE — 97110 THERAPEUTIC EXERCISES: CPT | Mod: GP | Performed by: PHYSICAL THERAPIST

## 2024-03-19 NOTE — PROGRESS NOTES
"Physical Therapy Treatment    Patient Name: Sai Scanlon  MRN: [id number]  Today's Date: 3/19/2024  Visit: 21  Referred by: Dr. Gu  Diagnosis:   1. Ankle impingement syndrome, left        2. Osteochondral defect of talus          SUBJECTIVE:  43 y.o. female s/p L) ankle scope for extensive debridement and subchondral drilling of medial talar and lateral tibial OCD, 11-16-23 by Dr. Gu.    She states that she has been trying to over do her WB activities and the pain has been improved, 0-3/10.      OBJECTIVE:  No deviation with  ambulating today.  Full AROM of ankle with mild ache with end range PF and inversion.  Resisted movements of ankle are strong and painless.    Outcome Measure:  FAAM- 24%    ASSESSMENT:  PT goals restructured today.  She continued to be comfortable throughout session and no increased ache reported today.     TREATMENT:  - Therex:  Upright bike L6 x 5 min     Incline calf S 60\" 2 way   Step DF S 10\" x 5  4-pt sit back PF 10\" x 6  Seated L) calf raise 30# 3x10 ea.    1/2 roll DF lifts 3x20  raised Cruz.  calf raises  3 x 20  Airex step touches 3x20  2nd step  Airex SLSB  x30\" x 3  4 way Steamboats blk  3x10 ea.  Airex @ 1/2 roll \"U\" tandem balance x 30\" x 2 ea.  TG mini-Leg Press L)  L7+ 3  3 x 15   Line cross over stepping 15' x 5 ea.     PLAN:   Continue daily HEP.  ICE as needed.  F/U next week.    "

## 2024-03-26 ENCOUNTER — TREATMENT (OUTPATIENT)
Dept: PHYSICAL THERAPY | Facility: CLINIC | Age: 44
End: 2024-03-26
Payer: COMMERCIAL

## 2024-03-26 DIAGNOSIS — M95.8 OSTEOCHONDRAL DEFECT OF TALUS: ICD-10-CM

## 2024-03-26 DIAGNOSIS — M76.72 PERONEAL TENDINITIS OF LEFT LOWER EXTREMITY: Primary | ICD-10-CM

## 2024-03-26 DIAGNOSIS — M25.872 ANKLE IMPINGEMENT SYNDROME, LEFT: ICD-10-CM

## 2024-03-26 DIAGNOSIS — M24.676 ANKYLOSIS OF SUBTALAR JOINT: ICD-10-CM

## 2024-03-26 PROCEDURE — 97110 THERAPEUTIC EXERCISES: CPT | Mod: GP | Performed by: PHYSICAL THERAPIST

## 2024-03-26 NOTE — PROGRESS NOTES
"Physical Therapy Treatment    Patient Name: Sai Scanlon  MRN: [id number]  Today's Date: 3/26/2024  Visit: 22  Referred by: Dr. Gu  Diagnosis:   1. Ankle impingement syndrome, left        2. Osteochondral defect of talus          SUBJECTIVE:  43 y.o. female s/p L) ankle scope for extensive debridement and subchondral drilling of medial talar and lateral tibial OCD, 11-16-23 by Dr. Gu.    She reports that overall the ankle is improved 0-3/10.      OBJECTIVE:  No deviation with  ambulating today.  Full AROM of ankle with mild ache with end range PF and inversion.  Resisted movements of ankle are strong and painless.    Outcome Measure:  FAAM- 24%    ASSESSMENT:  PT goals restructured today.  She continued to be comfortable throughout session and no increased ache reported today.     TREATMENT:  - Therex:  Upright bike L6 x 5 min     Incline calf S 60\" 2 way   Step DF S 10\" x 5  4-pt sit back PF 10\" x 6  Seated L) calf raise 35# 3x15 ea.    1/2 roll DF lifts 3x20  raised Cruz.  calf raises  3 x 20  Airex SLSB  x30\" x 3  4 way Steamboats blk  2x15 ea.  Airex @ 1/2 roll \"U\" tandem balance x 30\" x 2 ea.  TG mini-Leg Press L)  L7+ 3  3 x 15   Line cross over stepping 15' x 5 ea.   Tband monster walks 30' x 3  G    PLAN:   Continue daily HEP.  ICE as needed.  F/U next week.  Anticipate 2-3 more sessions    "

## 2024-04-02 ENCOUNTER — APPOINTMENT (OUTPATIENT)
Dept: PHYSICAL THERAPY | Facility: CLINIC | Age: 44
End: 2024-04-02
Payer: COMMERCIAL

## 2024-04-16 ENCOUNTER — TREATMENT (OUTPATIENT)
Dept: PHYSICAL THERAPY | Facility: CLINIC | Age: 44
End: 2024-04-16
Payer: COMMERCIAL

## 2024-04-16 DIAGNOSIS — M76.72 PERONEAL TENDINITIS OF LEFT LOWER EXTREMITY: ICD-10-CM

## 2024-04-16 DIAGNOSIS — M95.8 OSTEOCHONDRAL DEFECT OF TALUS: ICD-10-CM

## 2024-04-16 DIAGNOSIS — M24.676 ANKYLOSIS OF SUBTALAR JOINT: ICD-10-CM

## 2024-04-16 DIAGNOSIS — M25.872 ANKLE IMPINGEMENT SYNDROME, LEFT: ICD-10-CM

## 2024-04-16 PROCEDURE — 97110 THERAPEUTIC EXERCISES: CPT | Mod: GP | Performed by: PHYSICAL THERAPIST

## 2024-04-16 NOTE — PROGRESS NOTES
"Physical Therapy Treatment    Patient Name: Sai Scanlon  MRN: [id number]  Today's Date: 4/16/2024  Visit: 23  Referred by: Dr. Gu  Diagnosis:   1. Ankle impingement syndrome, left        2. Osteochondral defect of talus          SUBJECTIVE:  43 y.o. female s/p L) ankle scope for extensive debridement and subchondral drilling of medial talar and lateral tibial OCD, 11-16-23 by Dr. Gu.    She reports that overall the ankle is about the same, 0-3/10.  She hasn't been consistent with her HEP.      OBJECTIVE:  No deviation with  ambulating today.  Full AROM of ankle with mild ache with end range PF and inversion.  Resisted movements of ankle are strong and painless.    Outcome Measure:  FAAM- 24%    ASSESSMENT:  PT goals restructured today.  She continued to be comfortable throughout session and no increased ache reported today.   She needs to get back to performing HEP more consistently.    TREATMENT:  - Therex:  Upright bike L6 x 5 min     Incline calf S 60\" 2 way   Step DF S 10\" x 5  4-pt sit back PF 10\" x 6  Seated L) calf raise 35# 2x15 ea. held   1/2 roll DF lifts 3x20  raised Cruz.  calf raises  3 x 20  Airex SLSB  x30\" x 3  4 way Steamboats blk  2x15 ea.  Airex @ 1/2 roll \"U\" tandem balance x 30\" x 2 ea.  TG mini-Leg Press L)  L7+ 3  3 x 15   Line cross over stepping 15' x 5 ea.   Tband lateral crab walks 30' x 3  G    PLAN:   Continue daily HEP.  ICE as needed.  F/U in 2 weeks.  Anticipate 2 more sessions    "

## 2024-05-02 ENCOUNTER — TREATMENT (OUTPATIENT)
Dept: PHYSICAL THERAPY | Facility: CLINIC | Age: 44
End: 2024-05-02
Payer: COMMERCIAL

## 2024-05-02 DIAGNOSIS — M95.8 OSTEOCHONDRAL DEFECT OF TALUS: ICD-10-CM

## 2024-05-02 DIAGNOSIS — M25.872 ANKLE IMPINGEMENT SYNDROME, LEFT: ICD-10-CM

## 2024-05-02 DIAGNOSIS — M24.676 ANKYLOSIS OF SUBTALAR JOINT: ICD-10-CM

## 2024-05-02 DIAGNOSIS — M76.72 PERONEAL TENDINITIS OF LEFT LOWER EXTREMITY: ICD-10-CM

## 2024-05-02 PROCEDURE — 97110 THERAPEUTIC EXERCISES: CPT | Mod: GP | Performed by: PHYSICAL THERAPIST

## 2024-05-02 NOTE — PROGRESS NOTES
"Physical Therapy Treatment    Patient Name: Sai Scanlon  MRN: [id number]  Today's Date: 5/2/2024  Visit: 24  Referred by: Dr. Gu  Diagnosis:   1. Ankle impingement syndrome, left        2. Osteochondral defect of talus          SUBJECTIVE:  43 y.o. female s/p L) ankle scope for extensive debridement and subchondral drilling of medial talar and lateral tibial OCD, 11-16-23 by Dr. Gu.    She reports that she continues to have increased pain in the anterolateral ankle when she does a lot on he feet, 8-9/10.  When she took Ibuprofen or used Voltaren gel it improved but didn't abolish.  Today 2-3/10.  She has been consistent with her HEP, 3x/week.      OBJECTIVE:  No deviation with  ambulating today.  Full AROM of ankle with mild ache with end range PF and inversion.  Resisted movements of ankle are strong and painless.  Mild TTP on anterior lateral corner of the L) talus.    Outcome Measure:  FAAM- 24%    ASSESSMENT:  PT goals restructured today.  She continued to be comfortable throughout session and no increased ache reported today.   She F/U with  in a few weeks.    TREATMENT:  - Therex:  Upright bike L6 x 5 min     Incline calf S 60\" 2 way   4-pt sit back PF 10\" x 6 Held  Seated L) calf raise 35# 2x15 ea. held   1/2 roll DF lifts 3x20  raised Cruz.  calf raises  3 x 20  Airex SLSB  x30\" x 3  4 way Steamboats blk  2x15 ea.  Airex @ 1/2 roll \"U\" tandem balance x 30\" x 2 ea.  TG mini-Leg Press L)  L7+ 3  3 x 15   Line cross over stepping 15' x 5 ea.   Tband lateral crab walks 30' x 5  G    PLAN:   Continue daily HEP.  ICE as needed.  F/U in 4 weeks after her MD appointment.    "

## 2024-05-08 ENCOUNTER — APPOINTMENT (OUTPATIENT)
Dept: PHYSICAL THERAPY | Facility: CLINIC | Age: 44
End: 2024-05-08
Payer: COMMERCIAL

## 2024-05-15 ENCOUNTER — APPOINTMENT (OUTPATIENT)
Dept: PHYSICAL THERAPY | Facility: CLINIC | Age: 44
End: 2024-05-15
Payer: COMMERCIAL

## 2024-05-16 ENCOUNTER — OFFICE VISIT (OUTPATIENT)
Dept: DERMATOLOGY | Facility: CLINIC | Age: 44
End: 2024-05-16
Payer: COMMERCIAL

## 2024-05-16 DIAGNOSIS — L70.0 ACNE VULGARIS: Primary | ICD-10-CM

## 2024-05-16 PROCEDURE — 99203 OFFICE O/P NEW LOW 30 MIN: CPT | Performed by: SPECIALIST

## 2024-05-16 RX ORDER — CEFUROXIME AXETIL 250 MG/1
250 TABLET ORAL DAILY
Qty: 30 TABLET | Refills: 0 | Status: SHIPPED | OUTPATIENT
Start: 2024-05-16

## 2024-05-16 RX ORDER — DAPSONE 50 MG/G
GEL TOPICAL DAILY
Qty: 60 G | Refills: 0 | Status: SHIPPED | OUTPATIENT
Start: 2024-05-16 | End: 2025-05-16

## 2024-05-16 NOTE — PROGRESS NOTES
Subjective   HPI: Sai Scanlon is a 44 y.o. female is here for evaluation and treatment of acne on my face.  I am interested like..     ROS: No other skin or systemic complaints other than what is documented elsewhere in the note.    ALLERGIES: Levofloxacin    SOCIAL:  reports that she has quit smoking. Her smoking use included cigarettes. She has never used smokeless tobacco. She reports current alcohol use of about 4.0 standard drinks of alcohol per week. She reports that she does not currently use drugs.    Objective     Description: Patient has active 70 involving her face.  I discussed that cystic acne he has been treated with oral antibiotics or isotretinoin and not bluelight phototherapy which is meant for actinic damage.    Assessment/Plan   1. Acne vulgaris    Related Medications  cefuroxime (Ceftin) 250 mg tablet  Take 1 tablet (250 mg) by mouth once daily.    dapsone (Aczone) 5 % gel  Apply topically once daily.       Plan: I discussed isotretinoin in detail including its numerous possible side effects.  I discussed suppressive therapy for the first month with oral antibiotics and topical medications and patient was agreeable.  Patient was started on Ceftin 250 mg p.o. daily dapsone 5% nightly after she fulfills her I pledge requirements patient will be started 20-week course of isotretinoin.  I discussed the need for monthly visits and labs.  Signed I pledge booklet.  Check SMA-18 CBC and differential serum pregnancy.    FOLLOW UP: 4 weeks to start isotretinoin.    The patient was encouraged to contact me with any further questions or concerns.  Jerald Covarrubias MD  5/16/2024

## 2024-05-31 ENCOUNTER — OFFICE VISIT (OUTPATIENT)
Dept: ORTHOPEDIC SURGERY | Facility: CLINIC | Age: 44
End: 2024-05-31
Payer: COMMERCIAL

## 2024-05-31 DIAGNOSIS — M25.572 SINUS TARSI SYNDROME OF LEFT FOOT: Primary | ICD-10-CM

## 2024-05-31 PROCEDURE — 99213 OFFICE O/P EST LOW 20 MIN: CPT | Performed by: ORTHOPAEDIC SURGERY

## 2024-05-31 NOTE — PROGRESS NOTES
This is a pleasant 44 y.o. year old female who presents for fuv of left ankle and hindfoot pain.  She feels that she has hit a plateau with her recovery.  She continues to have anterior lateral ankle pain and perineal pain with normal day-to-day activity.  She has continued physical therapy and home exercises without relief.  She does note occasional swelling but none today.  She denies any mechanical locking or catching.    PHYSICAL EXAMINATION  Constitutional Exam: patient's height and weight reviewed, well-kempt  Psychiatric Exam: alert and oriented x 3, appropriate mood and behavior  Eye Exam: CHAPIN, EOMI  Pulmonary Exam: breathing non-labored, no apparent distress  Lymphatic exam: no appreciable lymphadenopathy in the lower extremities  Cardiovascular exam: DP pulses 2+ bilaterally, PT 2+ bilaterally, toes are pink with good capillary refill, no pitting edema  Skin exam: no open lesions, rashes, abrasions or ulcerations  Neurological exam: sensation to light touch intact in both lower extremities in peripheral and dermatomal distributions (except for any abnormalities noted in musculoskeletal exam)    Musculoskeletal exam: left foot and ankle: no pain on palpation over medial malleolus, no ankle effusion, full ankle and ST ROM, strength DF/PF/INV/EV improving, wounds healed well, mild tenderness over peroneals, mild anterolateral pain with hyper dorsiflexion, no talar crepitus    ASSESSMENT: hx of left ankle drilling ocd medial talus, left subtalar scope with debridement, peroneal tendinoscopy 11/16/23  PLAN: Further treatment options discussed including continuing PT.  IF she can decrease the walking and standing or have recovery days (hard to continue to do 5 miles every day wrt errands and house related activities without break causing tendinitis to occur along achilles and peroneals).  She does not appear to have a medial malleolar stress fracture as no swelling in that location and nontender to palpation  and she notes only occasional intermittent pain.  PT orders updated.  Discussed starting exercise bike, pool walking or jogging or elliptical for cardio, lower impact options.  Her muscle girth is better but still needs work, encouraged her to continue strengthening.  Use of voltaren gel over tendinitis areas is ok, or use of advil.  FUV 2-3 months.  Discussed with her that cartilage defect prolongs recovery process but she appears to be doing well and is walking quite a bit already.  Due to pain in the sinus tarsi area and subtalar joint region, she and I discussed possible injection as she has a trip to [place] coming up and would like to see if can relieve some of her pain.  After discussion of risks and benefits, the patient wanted to proceed with injection and post-injection precautions and instructions given.      Patient ID: Sai Scanlon is a 44 y.o. female.    M Inj/Asp: L ankle on 6/4/2024 5:30 PM  Indications: pain  Details: 22 G needle, anterolateral approach  Medications: 40 mg triamcinolone acetonide 40 mg/mL; 2 mL lidocaine 10 mg/mL (1 %)  Outcome: tolerated well, no immediate complications    PROCEDURE NOTE  I reviewed the risks and benefits of left subtalar joint injection with the patient.  Risks including pain, bleeding, infection, hypopigmentation of the skin, loss of subcutaneous fat, metabolic complications and possibility that further injections may not be effective.  The patient gave informed consent for the procedure.      A time-out was called and confirmed identifying the left subtalar joint as the site of injection.  Sterile skin preparation was performed.  Then, using sterile technique, injection with 2 cc of 1% lidocaine and 1 cc of kenalog 40 with no complications.   The patient was given post-procedure instructions and precautions.  I personally performed the procedure.     Procedure, treatment alternatives, risks and benefits explained, specific risks discussed. Immediately prior to  procedure a time out was called to verify the correct patient, procedure, equipment, support staff and site/side marked as required. Patient was prepped and draped in the usual sterile fashion.       The patient's questions were answered in detail.      Note dictated with [employer] software, completed without full type editing to avoid delay.

## 2024-06-04 PROCEDURE — 20605 DRAIN/INJ JOINT/BURSA W/O US: CPT | Performed by: ORTHOPAEDIC SURGERY

## 2024-06-04 RX ORDER — TRIAMCINOLONE ACETONIDE 40 MG/ML
40 INJECTION, SUSPENSION INTRA-ARTICULAR; INTRAMUSCULAR
Status: COMPLETED | OUTPATIENT
Start: 2024-06-04 | End: 2024-06-04

## 2024-06-04 RX ORDER — LIDOCAINE HYDROCHLORIDE 10 MG/ML
2 INJECTION INFILTRATION; PERINEURAL
Status: COMPLETED | OUTPATIENT
Start: 2024-06-04 | End: 2024-06-04

## 2024-06-04 RX ADMIN — LIDOCAINE HYDROCHLORIDE 2 ML: 10 INJECTION INFILTRATION; PERINEURAL at 17:30

## 2024-06-04 RX ADMIN — TRIAMCINOLONE ACETONIDE 40 MG: 40 INJECTION, SUSPENSION INTRA-ARTICULAR; INTRAMUSCULAR at 17:30

## 2024-06-10 ENCOUNTER — APPOINTMENT (OUTPATIENT)
Dept: ORTHOPEDIC SURGERY | Facility: HOSPITAL | Age: 44
End: 2024-06-10
Payer: COMMERCIAL

## 2024-06-18 ENCOUNTER — APPOINTMENT (OUTPATIENT)
Dept: PHYSICAL THERAPY | Facility: CLINIC | Age: 44
End: 2024-06-18
Payer: COMMERCIAL

## 2024-06-20 ENCOUNTER — APPOINTMENT (OUTPATIENT)
Dept: DERMATOLOGY | Facility: CLINIC | Age: 44
End: 2024-06-20
Payer: COMMERCIAL

## 2024-07-12 ENCOUNTER — APPOINTMENT (OUTPATIENT)
Dept: PHYSICAL THERAPY | Facility: CLINIC | Age: 44
End: 2024-07-12
Payer: COMMERCIAL

## 2024-07-15 ENCOUNTER — OFFICE VISIT (OUTPATIENT)
Dept: ORTHOPEDIC SURGERY | Facility: HOSPITAL | Age: 44
End: 2024-07-15
Payer: COMMERCIAL

## 2024-07-15 DIAGNOSIS — M25.572 SINUS TARSI SYNDROME OF LEFT FOOT: Primary | ICD-10-CM

## 2024-07-15 DIAGNOSIS — M25.872 ANKLE IMPINGEMENT SYNDROME, LEFT: ICD-10-CM

## 2024-07-15 DIAGNOSIS — M76.72 PERONEAL TENDINITIS OF LEFT LOWER EXTREMITY: ICD-10-CM

## 2024-07-15 DIAGNOSIS — M95.8 OSTEOCHONDRAL DEFECT OF TALUS: ICD-10-CM

## 2024-07-15 PROCEDURE — 99213 OFFICE O/P EST LOW 20 MIN: CPT | Performed by: ORTHOPAEDIC SURGERY

## 2024-07-15 NOTE — PROGRESS NOTES
This is a pleasant 44 y.o. year old female who presents for fuv of left ankle and hindfoot pain.  She states she has had some improvement in the sinus tarsi area wrt discomfort; but she did a ton of walking right after injection. She went on vacation in [place] and then she had to move into a new house upon her return to [place].  She notes that once she finished moving process, her pain has gotten better.  Pain moves around at times over medial ankle, other times over sinus tarsi area.  Sometimes she feels ankle may give way.  She feels that her muscle tone is improving.  No deep catching or locking in her ankle or subtalar joint. She has been doing her HEP. She gets her orthotics next week.     PHYSICAL EXAMINATION  Constitutional Exam: patient's height and weight reviewed, well-kempt  Psychiatric Exam: alert and oriented x 3, appropriate mood and behavior  Eye Exam: CHAPIN, EOMI  Pulmonary Exam: breathing non-labored, no apparent distress  Lymphatic exam: no appreciable lymphadenopathy in the lower extremities  Cardiovascular exam: DP pulses 2+ bilaterally, PT 2+ bilaterally, toes are pink with good capillary refill, no pitting edema  Skin exam: no open lesions, rashes, abrasions or ulcerations  Neurological exam: sensation to light touch intact in both lower extremities in peripheral and dermatomal distributions (except for any abnormalities noted in musculoskeletal exam)    Musculoskeletal exam: left foot and ankle: no pain on palpation over medial malleolus or lateral malleolus, no pain on palpation of the anterior ankle joint line, minimal pain over sinus tarsi/ST joint area laterally, no ankle or subtalar effusion, full ankle and ST ROM, strength DF/PF/INV/EV intact wounds healed well, no tenderness over peroneals, no talar crepitus    ASSESSMENT: left intermittent posterior tibial tendinitis/pain, sinus tarsi pain, adult onset flatfoot likely with some sinus tarsi impingement, hx of left ankle drilling ocd medial  talus, left subtalar scope with debridement, peroneal tendinoscopy 11/16/23  PLAN: Further treatment options discussed including continuing PT.  We discussed how the orthotics can help with sinus tarsi related pain.  She is getting them soon.  PT orders updated.  Discussed starting exercise bike, pool walking or jogging or elliptical for cardio, lower impact options.  Her muscle girth is better but still needs work, encouraged her to continue strengthening.  Use of voltaren gel over tendinitis areas is ok, or use of advil.  Discussed with her that possibility chondromalacia ST joint also can cause some of the pain laterally.  In future, may consider repeat MRI to look at subtalar joint further.  All the patient's questions were answered. The patient agrees with the above plan.  Follow up in 3 months or so.    I personally saw and evaluated the patient. I personally obtained the key and critical portions of the history and physical exam or was physically present for key and critical portions performed by the Logan Luu PA-C. I reviewed the PA-C 's documentation and discussed the patient with the PA-C. I agree with the PA-C's medical decision making as documented in the note.    Emma Gu MD     The patient's questions were answered in detail.      Note dictated with [email or web address] software, completed without full type editing to avoid delay.

## 2024-07-15 NOTE — PROGRESS NOTES
"This is a pleasant 44 y.o. year old female who presents for fuv of {ANATOMY; ANKLE/FOOT/RIGHT/LEFT/BOTH:78372} ***.  Interventions: ***    PHYSICAL EXAMINATION  Constitutional Exam: patient's height and weight reviewed, well-kempt  Psychiatric Exam: alert and oriented x 3, appropriate mood and behavior  Eye Exam: CHAPIN, EOMI  Pulmonary Exam: breathing non-labored, no apparent distress  Lymphatic exam: no appreciable lymphadenopathy in the lower extremities  Cardiovascular exam: DP pulses 2+ bilaterally, PT 2+ bilaterally, toes are pink with good capillary refill, no pitting edema  Skin exam: no open lesions, rashes, abrasions or ulcerations  Neurological exam: sensation to light touch intact in both lower extremities in peripheral and dermatomal distributions (except for any abnormalities noted in musculoskeletal exam)    Musculoskeletal exam: ***    DATA/RESULTS REVIEW: I personally reviewed the patient's x-ray images and reports of the {ANATOMY; ANKLE/FOOT/RIGHT/LEFT/BOTH:86834::\"***\"}.     ASSESSMENT: hx of left ankle drilling ocd medial talus, left subtalar scope with debridement, peroneal tendinoscopy 11/16/23   PLAN: Further treatment options discussed including ***.  The patient's questions were answered in detail.      Note dictated with [email or web address] software, completed without full type editing to avoid delay.      "

## 2024-07-22 ENCOUNTER — APPOINTMENT (OUTPATIENT)
Dept: ORTHOPEDIC SURGERY | Facility: HOSPITAL | Age: 44
End: 2024-07-22
Payer: COMMERCIAL

## 2024-07-31 ENCOUNTER — TREATMENT (OUTPATIENT)
Dept: PHYSICAL THERAPY | Facility: CLINIC | Age: 44
End: 2024-07-31
Payer: COMMERCIAL

## 2024-07-31 DIAGNOSIS — M24.676 ANKYLOSIS OF SUBTALAR JOINT: ICD-10-CM

## 2024-07-31 DIAGNOSIS — M76.72 PERONEAL TENDINITIS OF LEFT LOWER EXTREMITY: ICD-10-CM

## 2024-07-31 DIAGNOSIS — M25.872 ANKLE IMPINGEMENT SYNDROME, LEFT: ICD-10-CM

## 2024-07-31 DIAGNOSIS — M95.8 OSTEOCHONDRAL DEFECT OF TALUS: ICD-10-CM

## 2024-07-31 PROCEDURE — 97110 THERAPEUTIC EXERCISES: CPT | Mod: GP | Performed by: PHYSICAL THERAPIST

## 2024-07-31 NOTE — PROGRESS NOTES
"Physical Therapy Treatment    Patient Name: Sai Scanlon  MRN: [id number]  Today's Date: 5/2/2024  Visit: 25  Referred by: Dr. Gu  Diagnosis:   1. Ankle impingement syndrome, left        2. Osteochondral defect of talus          SUBJECTIVE:  43 y.o. female s/p L) ankle scope for extensive debridement and subchondral drilling of medial talar and lateral tibial OCD, 11-16-23 by Dr. Gu.    She reports that she was in [place]  for ~11 days and recently moved into a new home.  Pain spiked at this time.  It has decreased some since than as she backed things down a lot.  Saw Dr. Gu and told she has tendinitis of peroneals and post. Tib.  Along with inflamation inside joint.  Today 0-1/10.  Told to start back with PT.  She has been consistent with her HEP.      OBJECTIVE:  No deviation with  ambulating today.  Full AROM of ankle with mild limitation with end range PF.  Painful calf raise on L).  Only able to do 3 reps.  Resisted movements of ankle;  4/5 inversion and PF.  4+/5 eversion.  Mild TTP on anterior lateral corner of the L) talus.  SLSB good to 20+ seconds    Outcome Measure:  FAAM- 24%    ASSESSMENT:  Pt returns today after long break from therapy.  Reports flare up of ankle pain.  She is functioning fairly well with ADLs.  She tolerated ex's well      TREATMENT:  - Therex:  Upright bike L6 x 5 min      2 way wall calf S 30\" 2 way   4-pt sit back PF 10\" x 10  Seated L) calf raise 35# 2x15 ea. held   TB INV  R  5\" x 15  TB EV  R  5\" x 15  TB eccentric emphasized PF  blk  (2/5)   2x15  Airex SLSB  x30\" x 3  4 way [place] blk  2x15 ea.    PLAN:   Continue daily HEP.  ICE as needed.  F/U will be weekly.  Progress with decreased pain, inflammation and tendon loading.    GOALS:  Active       PT Problem       Pt. will have improved pain free ROM to assist with improving functional tolerances (Met)       Start:  12/01/23    Expected End:  02/29/24    Resolved:  03/19/24         Pt will have improved LE " strength to assist with improving functional tolerances       Start:  12/01/23    Expected End:  09/27/24            Pt. will have improved FAAM score by at lest 15%       Start:  12/01/23    Expected End:  09/27/24            Pt. will have improved tolerances for stairs, standing and walking (Met)       Start:  12/01/23    Expected End:  02/29/24    Resolved:  03/19/24         Pt. will be independent with HEP (Met)       Start:  12/01/23    Expected End:  02/29/24    Resolved:  03/19/24         Pt will have decreased reports of pain by at least 2 levels using a VAS       Start:  12/01/23    Expected End:  09/27/24

## 2024-08-02 ENCOUNTER — TELEPHONE (OUTPATIENT)
Dept: PHYSICAL THERAPY | Facility: CLINIC | Age: 44
End: 2024-08-02
Payer: COMMERCIAL

## 2024-08-02 NOTE — TELEPHONE ENCOUNTER
Erika with [employer] (pt's secondary ins) cld in and advised they follow the guidelines as the patient's primary insurance.     When she reaches her PT visit limit with the primary, she reaches it with them.     Pt was contacted and advised that her secondary insurance will not cover any more PT visits.     Primary insurance 20 visits allowed (hard max).    Erika ATKINS Call ref# [id number]/02/2024  [phone]

## 2024-08-09 ENCOUNTER — APPOINTMENT (OUTPATIENT)
Dept: PHYSICAL THERAPY | Facility: CLINIC | Age: 44
End: 2024-08-09
Payer: COMMERCIAL

## 2024-08-15 ENCOUNTER — APPOINTMENT (OUTPATIENT)
Dept: PHYSICAL THERAPY | Facility: CLINIC | Age: 44
End: 2024-08-15
Payer: COMMERCIAL

## 2024-08-23 ENCOUNTER — APPOINTMENT (OUTPATIENT)
Dept: PHYSICAL THERAPY | Facility: CLINIC | Age: 44
End: 2024-08-23
Payer: COMMERCIAL

## 2024-08-30 ENCOUNTER — APPOINTMENT (OUTPATIENT)
Dept: PHYSICAL THERAPY | Facility: CLINIC | Age: 44
End: 2024-08-30
Payer: COMMERCIAL

## 2024-09-06 ENCOUNTER — APPOINTMENT (OUTPATIENT)
Dept: PHYSICAL THERAPY | Facility: CLINIC | Age: 44
End: 2024-09-06
Payer: COMMERCIAL

## 2024-09-09 ENCOUNTER — APPOINTMENT (OUTPATIENT)
Dept: PHYSICAL THERAPY | Facility: CLINIC | Age: 44
End: 2024-09-09
Payer: COMMERCIAL

## 2024-09-16 ENCOUNTER — OFFICE VISIT (OUTPATIENT)
Dept: ORTHOPEDIC SURGERY | Facility: HOSPITAL | Age: 44
End: 2024-09-16
Payer: COMMERCIAL

## 2024-09-16 DIAGNOSIS — M25.572 SINUS TARSI SYNDROME OF LEFT FOOT: Primary | ICD-10-CM

## 2024-09-16 PROCEDURE — 99212 OFFICE O/P EST SF 10 MIN: CPT | Performed by: ORTHOPAEDIC SURGERY

## 2024-09-16 ASSESSMENT — PAIN - FUNCTIONAL ASSESSMENT: PAIN_FUNCTIONAL_ASSESSMENT: 0-10

## 2024-09-16 ASSESSMENT — PAIN DESCRIPTION - DESCRIPTORS: DESCRIPTORS: SORE

## 2024-09-16 NOTE — PROGRESS NOTES
This is a pleasant 44 y.o. year old female who presents for fuv of  left foot and ankle.  She states that it is feeling better, slow progress.  She exercise walks up to 1/2 mile with dog and then walks with life (kids in cross country and so she is walking to course) up to 5 miles a day or more.  She feels stronger.  Tightness feeling over sinus tarsi only but pain gone with use of her orthotics.   She is doing her PT exercises regularly.  Notes soreness only at end of a long day where she has been very active. No give way episodes, no deep catching or locking.     PHYSICAL EXAMINATION  Constitutional Exam: patient's height and weight reviewed, well-kempt  Psychiatric Exam: alert and oriented x 3, appropriate mood and behavior  Eye Exam: CHAPIN, EOMI  Pulmonary Exam: breathing non-labored, no apparent distress  Lymphatic exam: no appreciable lymphadenopathy in the lower extremities  Cardiovascular exam: DP pulses 2+ bilaterally, PT 2+ bilaterally, toes are pink with good capillary refill, no pitting edema  Skin exam: no open lesions, rashes, abrasions or ulcerations  Neurological exam: sensation to light touch intact in both lower extremities in peripheral and dermatomal distributions (except for any abnormalities noted in musculoskeletal exam)    Musculoskeletal exam: left foot and ankle: no swelling, no effusion, full ROM, incisions benign and healed, motor intact and full strength 5/5 for DF/PF/INV/EV, normal gait    ASSESSMENT: Left ankle arthroscopy with drilling of OCD talus, subtalar joint arthroscopy with debridement, peroneal tendinoscopy 11/16/23  PLAN: Further treatment options discussed including discussed slow progression with respect to exercise walking program and eventual return to run program.  Since she is walking up to 5-6 miles a day, we would count that as impact day and so the following day she should cross train if possible.  Overall her endurance is much improved and will continue to improve  over time.  FUV in 6 months, sooner if any concerns.  Continue the orthotics as they have been helpful in resolving the pain/impingement in sinus tarsi.  The patient's questions were answered in detail.      Note dictated with [employer] software, completed without full type editing to avoid delay.

## 2024-10-02 ENCOUNTER — OFFICE VISIT (OUTPATIENT)
Dept: URGENT CARE | Age: 44
End: 2024-10-02
Payer: COMMERCIAL

## 2024-10-02 VITALS
SYSTOLIC BLOOD PRESSURE: 107 MMHG | TEMPERATURE: 98.4 F | HEART RATE: 72 BPM | BODY MASS INDEX: 26.52 KG/M2 | WEIGHT: 145 LBS | RESPIRATION RATE: 16 BRPM | OXYGEN SATURATION: 96 % | DIASTOLIC BLOOD PRESSURE: 73 MMHG

## 2024-10-02 DIAGNOSIS — J01.10 ACUTE NON-RECURRENT FRONTAL SINUSITIS: Primary | ICD-10-CM

## 2024-10-02 RX ORDER — BENZONATATE 200 MG/1
200 CAPSULE ORAL 3 TIMES DAILY PRN
Qty: 20 CAPSULE | Refills: 0 | Status: SHIPPED | OUTPATIENT
Start: 2024-10-02 | End: 2024-10-09

## 2024-10-02 RX ORDER — AMOXICILLIN AND CLAVULANATE POTASSIUM 875; 125 MG/1; MG/1
1 TABLET, FILM COATED ORAL 2 TIMES DAILY
Qty: 14 TABLET | Refills: 0 | Status: SHIPPED | OUTPATIENT
Start: 2024-10-02 | End: 2024-10-09

## 2024-10-02 NOTE — PROGRESS NOTES
Subjective   Patient ID: Sai Scanlon is a 44 y.o. female. They present today with a chief complaint of Cough (Left left pain).    History of Present Illness  HPI  Is over 1 week of sinus congestion and pain around her left eye now.  She has had a cough that is worse at night from postnasal drip.  No wheezing, chest pain or shortness of breath.  No purulent nasal drainage.  No fever.  He is taking a decongestant without relief.    Past Medical History  Allergies as of 10/02/2024 - Reviewed 10/02/2024   Allergen Reaction Noted    Levofloxacin Other 11/03/2023       (Not in a hospital admission)             Past Medical History:   Diagnosis Date    Anxiety     Asthma     Hypothyroidism     Migraine     Personal history of other diseases of the respiratory system 11/30/2017    History of asthma       No past surgical history on file.     reports that she has quit smoking. Her smoking use included cigarettes. She has never used smokeless tobacco. She reports current alcohol use of about 4.0 standard drinks of alcohol per week. She reports that she does not currently use drugs.    Review of Systems  Review of Systems   Review of systems: A complete review of systems was done, and is as stated in the history of present illness, is otherwise negative or not pertinent to the complaint.       Objective    Vitals:    10/02/24 1211   BP: 107/73   Pulse: 72   Resp: 16   Temp: 36.9 °C (98.4 °F)   SpO2: 96%   Weight: 65.8 kg (145 lb)     No LMP recorded.    Physical Exam  NAD. Well appearing    MMM   PERRLA. EOMI. Nontender. No chemosis  no icterus   TMs clear bilat   Ttp frontal sinuses  Oropharynx clear of exudate or tonsillar swelling/exudate   neck supple. NAE. No LAD   no resp distress. Lungs CTAB without w/r/r   RRR. No m/r/g   Abd; Soft. NTTP   CRAIG x 4. MS 5/5   Skin; warm without rashes   pulses 2+ throughout   neuro intact with normal sensation and motor   psych a&o x 3       Procedures    Point of Care Test & Imaging  Results from this visit  No results found for this or any previous visit.    Assessment/Plan   Allergies, medications, history, and pertinent labs/EKGs/Imaging reviewed by Brown Almeida PA-C.     Medical Decision Making  -cw sinusitis. No signs of PNA or LRTI.   -augmentin course  -con't supportive care  -fu eye dr if visual changes or ongoing ocular pain  -fu pcp 2-3 days if not improved  All ?s answered     Orders and Diagnoses  Diagnoses and all orders for this visit:  Acute non-recurrent frontal sinusitis  -     amoxicillin-pot clavulanate (Augmentin) 875-125 mg tablet; Take 1 tablet by mouth 2 times a day for 7 days.  -     benzonatate (Tessalon) 200 mg capsule; Take 1 capsule (200 mg) by mouth 3 times a day as needed for cough for up to 7 days. Do not crush or chew.

## 2024-11-07 ENCOUNTER — APPOINTMENT (OUTPATIENT)
Dept: OBSTETRICS AND GYNECOLOGY | Facility: CLINIC | Age: 44
End: 2024-11-07
Payer: COMMERCIAL

## 2024-11-07 VITALS
BODY MASS INDEX: 27.79 KG/M2 | SYSTOLIC BLOOD PRESSURE: 105 MMHG | WEIGHT: 147.2 LBS | HEIGHT: 61 IN | DIASTOLIC BLOOD PRESSURE: 68 MMHG

## 2024-11-07 DIAGNOSIS — Z12.31 ENCOUNTER FOR SCREENING MAMMOGRAM FOR BREAST CANCER: ICD-10-CM

## 2024-11-07 DIAGNOSIS — Z12.4 PAP SMEAR FOR CERVICAL CANCER SCREENING: ICD-10-CM

## 2024-11-07 DIAGNOSIS — Z01.419 WELL WOMAN EXAM WITH ROUTINE GYNECOLOGICAL EXAM: Primary | ICD-10-CM

## 2024-11-07 PROCEDURE — 1036F TOBACCO NON-USER: CPT | Performed by: NURSE PRACTITIONER

## 2024-11-07 PROCEDURE — 3008F BODY MASS INDEX DOCD: CPT | Performed by: NURSE PRACTITIONER

## 2024-11-07 PROCEDURE — 99386 PREV VISIT NEW AGE 40-64: CPT | Performed by: NURSE PRACTITIONER

## 2024-11-07 PROCEDURE — 88141 CYTOPATH C/V INTERPRET: CPT | Performed by: PATHOLOGY

## 2024-11-07 PROCEDURE — 87624 HPV HI-RISK TYP POOLED RSLT: CPT

## 2024-11-07 PROCEDURE — 88175 CYTOPATH C/V AUTO FLUID REDO: CPT

## 2024-11-07 NOTE — PROGRESS NOTES
HPI: Sai Scanlon is a 44 y.o. year old  presenting for annual gyn exam  Pt new to this office, seen by me in past   H/o hypothyroidism  , has 3 boys and oldest is senior in  now applying for colleges  Current concerns: none    Cycles are regular monthly, lasting for 2-3 days, sometimes heavier and cramping   LMP:  Patient's last menstrual period was 10/19/2023 (exact date).  Sexually active:   STI concerns: no  Contraception: vasectomy  Last pap: ? normal, neg HPV  Last mamm:  nml  History of abnormal pap: no  Other gyn history:  x1, c/s x2, breast reduction   OB History        3    Para   3    Term   3       0    AB   0    Living   3       SAB   0    IAB   0    Ectopic   0    Multiple   0    Live Births   3              Past Medical History:  No date: Anxiety  No date: Asthma  No date: Hypothyroidism  No date: Migraine  2017: Personal history of other diseases of the respiratory   system      Comment:  History of asthma   No past surgical history on file.   Social History    Socioeconomic History      Marital status:       Spouse name: Not on file      Number of children: Not on file      Years of education: Not on file      Highest education level: Not on file    Occupational History      Not on file    Tobacco Use      Smoking status: Former        Types: Cigarettes      Smokeless tobacco: Never    Substance and Sexual Activity      Alcohol use: Yes        Alcohol/week: 4.0 standard drinks of alcohol        Types: 2 Glasses of wine, 2 Shots of liquor per week      Drug use: Not Currently      Sexual activity: Defer    Other Topics      Concerns:        Not on file    Social History Narrative      Not on file    Social Drivers of Health  Financial Resource Strain: Low Risk  (2022)      [facility]      Overall Financial Resource Strain (CARDIA)          Difficulty of Paying Living Expenses: Not hard at all  Food  Insecurity: No Food Insecurity (7/31/2022)      Received from [facility]      Hunger Vital Sign          Worried About Running Out of Food in the Last Year: Never true          Ran Out of Food in the Last Year: Never true  Transportation Needs: No Transportation Needs (7/31/2022)      Received from [facility]      PRAPARE - Transportation          Lack of Transportation (Medical): No          Lack of Transportation (Non-Medical): No  Physical Activity: Insufficiently Active (7/31/2022)      Received from [facility]      Exercise Vital Sign          Days of Exercise per Week: 3 days          Minutes of Exercise per Session: 20 min  Stress: No Stress Concern Present (7/31/2022)      Received from [facility]      [employer] - Occupational Stress Questionnaire          Feeling of Stress : Not at all  Social Connections: Socially Integrated (7/31/2022)      Received from [facility]      Social Connection and Isolation Panel [NHANES]          Frequency of Communication with Friends and Family: More than three times a week          Frequency of Social Gatherings with Friends and Family: Twice a week           Attends Anabaptism Services: More than 4 times per year          Active Member of Clubs or Organizations: Yes          Attends Club or Organization Meetings: More than 4 times per year          Marital Status:   Intimate Partner Violence: Not on file  Housing Stability: Low Risk  (11/10/2022)      Received from [facility]      Housing Stability Vital Sign          Unable to Pay for Housing in the Last Year: No          Number of Places Lived in the Last Year: 1          Unstable Housing in the Last Year: No   Review of patient's family history indicates:  Problem: Asthma      Relation: Sister          Name:               Age of Onset: (Not  "Specified)     Current Outpatient Medications:   ALPRAZolam (Xanax) 0.25 mg tablet, 1 tablet (0.25 mg)., Disp: , Rfl:   magnesium 250 mg tablet, Take 1 tablet (250 mg) by mouth once daily., Disp: , Rfl:   melatonin 3 mg tablet, Take 1 tablet (3 mg) by mouth once daily at bedtime., Disp: , Rfl:   BIOTIN, BULK, MISC, , Disp: , Rfl:   cefuroxime (Ceftin) 250 mg tablet, Take 1 tablet (250 mg) by mouth once daily., Disp: 30 tablet, Rfl: 0  clobetasol (Temovate) 0.05 % cream, APPLY EXTERNALLY TO THE AFFECTED AREAS TWICE DAILY ON MONDAY TO FRIDAY FOR 1 TO 2 WEEKS AS NEEDED . TAKE BREAKS INBETWEEN, Disp: , Rfl:   dapsone (Aczone) 5 % gel, Apply topically once daily., Disp: 60 g, Rfl: 0  DULoxetine 40 mg DR capsule, Take 50 mg by mouth once daily., Disp: , Rfl:   flunisolide (Nasalide) 25 mcg (0.025 %) spray,non-aerosol, Administer 2 sprays into affected nostril(s) twice a day., Disp: , Rfl:   levalbuterol (Xopenex) 1.25 mg/0.5 mL nebulizer solution, Inhale., Disp: , Rfl:   levalbuterol (Xopenex) 45 mcg/actuation inhaler, Inhale 2 puffs every 4 hours if needed., Disp: , Rfl:   levothyroxine (Synthroid, Levoxyl) 50 mcg tablet, Take 1 tablet (50 mcg) by mouth once daily in the morning. Take before meals., Disp: , Rfl:           REVIEW OF SYSTEMS:  Breast. denies masses, nipple discharge  : denies dysuria, hematuria incontinence   Gl: denies change in bowel habits, blood in stools      PHYSICAL EXAM:  Vitals: /68 (BP Location: Right arm, Patient Position: Sitting)   Ht 1.549 m (5' 1\")   Wt 66.8 kg (147 lb 3.2 oz)   LMP 10/19/2023 (Exact Date)   BMI 27.81 kg/m²   Gen: well appearing woman in NAD  HEENT: normocephalic, thyroid not enlarged, no lymphadenopathy  CV: no m/r/g  Chest: CTAB, no w/r/r  Breast: normal tissue bilaterally without masses, skin changes, lymphadenopathy   Abdomen: soft, nontender, no masses/hernias, liver and spleen not enlarged   Pelvic:  - external genitalia: normal  -vagina: normal  - cervix: " normal  - uterus: normal size, nontender  adnexa: no palpable masses or tenderness  RECTAL: no external hemorrhoids; rectal exam deferred    ASSESSMENT/PLAN :    1) Health maintenance, Well-woman exam.  Pap done with HPV.  Mammogram screening ordered  Nutrition, exercise and routine health maintenance exams reviewed.  Calcium/Vitamin D supplementation information provided   Smoking cessation: rare smoker, on occas socially, discussed and recs to quit completely  2) Contraception: vasectomy satisfactory.   Contraceptive options reviewed and information provided, not interested to use for cycle/flow control at this time.  3) STD screening: declines, no concerns  4) Follow up one year or sooner as needed

## 2024-11-19 LAB
CYTOLOGY CMNT CVX/VAG CYTO-IMP: NORMAL
HPV HR 12 DNA GENITAL QL NAA+PROBE: NEGATIVE
HPV HR GENOTYPES PNL CVX NAA+PROBE: NEGATIVE
HPV16 DNA SPEC QL NAA+PROBE: NEGATIVE
HPV18 DNA SPEC QL NAA+PROBE: NEGATIVE
LAB AP HPV GENOTYPE QUESTION: YES
LAB AP HPV HR: NORMAL
LABORATORY COMMENT REPORT: NORMAL
PATH REPORT.TOTAL CANCER: NORMAL

## 2024-11-21 ENCOUNTER — HOSPITAL ENCOUNTER (OUTPATIENT)
Dept: RADIOLOGY | Facility: CLINIC | Age: 44
Discharge: HOME | End: 2024-11-21
Payer: COMMERCIAL

## 2024-11-21 VITALS — WEIGHT: 145 LBS | BODY MASS INDEX: 26.68 KG/M2 | HEIGHT: 62 IN

## 2024-11-21 DIAGNOSIS — Z12.31 ENCOUNTER FOR SCREENING MAMMOGRAM FOR BREAST CANCER: ICD-10-CM

## 2024-11-21 PROCEDURE — 77067 SCR MAMMO BI INCL CAD: CPT

## 2024-11-29 ENCOUNTER — HOSPITAL ENCOUNTER (OUTPATIENT)
Dept: RADIOLOGY | Facility: EXTERNAL LOCATION | Age: 44
Discharge: HOME | End: 2024-11-29

## 2024-12-11 ENCOUNTER — TELEPHONE (OUTPATIENT)
Dept: ORTHOPEDIC SURGERY | Facility: HOSPITAL | Age: 44
End: 2024-12-11
Payer: COMMERCIAL

## 2024-12-11 NOTE — TELEPHONE ENCOUNTER
Copied from CRM #[id number]. Topic: Information Request - Doctor, Hospital, or Provider  >> Dec 11, 2024 11:34 AM Romy RUSH wrote:  Patient is having ankle pain and would like to talk to nurse or provider before FUV appt. Patient can best be reached at [phone].

## 2025-01-06 PROBLEM — M21.619 HALLUX VALGUS WITH BUNIONS: Status: ACTIVE | Noted: 2023-11-03

## 2025-01-06 PROBLEM — M25.579 ANKLE PAIN: Status: ACTIVE | Noted: 2025-01-06

## 2025-01-06 PROBLEM — H69.90 DYSFUNCTION OF EUSTACHIAN TUBE: Status: ACTIVE | Noted: 2023-11-03

## 2025-01-06 PROBLEM — S92.009A FRACTURE OF CALCANEUS: Status: ACTIVE | Noted: 2025-01-06

## 2025-01-06 PROBLEM — M20.10 HALLUX VALGUS WITH BUNIONS: Status: ACTIVE | Noted: 2023-11-03

## 2025-01-06 PROBLEM — M79.673 PAIN OF FOOT: Status: ACTIVE | Noted: 2025-01-06

## 2025-01-06 PROBLEM — M25.559 ARTHRALGIA OF HIP: Status: ACTIVE | Noted: 2025-01-06

## 2025-01-06 PROBLEM — J30.2 SEASONAL ALLERGIC RHINITIS: Status: ACTIVE | Noted: 2025-01-06

## 2025-01-06 RX ORDER — ALPRAZOLAM 0.5 MG/1
TABLET ORAL
COMMUNITY
Start: 2024-06-04

## 2025-01-06 RX ORDER — FLUTICASONE PROPIONATE 44 UG/1
1 AEROSOL, METERED RESPIRATORY (INHALATION) 2 TIMES DAILY
COMMUNITY
Start: 2023-11-12

## 2025-01-06 RX ORDER — DULOXETIN HYDROCHLORIDE 60 MG/1
CAPSULE, DELAYED RELEASE ORAL
COMMUNITY
Start: 2024-12-09

## 2025-01-06 RX ORDER — DULOXETIN HYDROCHLORIDE 20 MG/1
2 CAPSULE, DELAYED RELEASE ORAL
COMMUNITY
Start: 2024-01-23

## 2025-01-07 ENCOUNTER — APPOINTMENT (OUTPATIENT)
Dept: PRIMARY CARE | Facility: CLINIC | Age: 45
End: 2025-01-07
Payer: COMMERCIAL

## 2025-01-07 VITALS
SYSTOLIC BLOOD PRESSURE: 110 MMHG | TEMPERATURE: 97.7 F | HEART RATE: 86 BPM | BODY MASS INDEX: 27.49 KG/M2 | OXYGEN SATURATION: 98 % | DIASTOLIC BLOOD PRESSURE: 80 MMHG | HEIGHT: 62 IN | WEIGHT: 149.4 LBS

## 2025-01-07 DIAGNOSIS — E03.9 HYPOTHYROIDISM, UNSPECIFIED TYPE: ICD-10-CM

## 2025-01-07 DIAGNOSIS — Z00.00 HEALTH MAINTENANCE EXAMINATION: Primary | ICD-10-CM

## 2025-01-07 LAB
ERYTHROCYTE [DISTWIDTH] IN BLOOD BY AUTOMATED COUNT: 12.6 % (ref 11.5–14.5)
HCT VFR BLD AUTO: 36.8 % (ref 36–46)
HGB BLD-MCNC: 12.5 G/DL (ref 12–16)
MCH RBC QN AUTO: 30.7 PG (ref 26–34)
MCHC RBC AUTO-ENTMCNC: 34 G/DL (ref 32–36)
MCV RBC AUTO: 90 FL (ref 80–100)
NRBC BLD-RTO: 0 /100 WBCS (ref 0–0)
PLATELET # BLD AUTO: 272 X10*3/UL (ref 150–450)
RBC # BLD AUTO: 4.07 X10*6/UL (ref 4–5.2)
WBC # BLD AUTO: 6 X10*3/UL (ref 4.4–11.3)

## 2025-01-07 PROCEDURE — 4004F PT TOBACCO SCREEN RCVD TLK: CPT | Performed by: FAMILY MEDICINE

## 2025-01-07 PROCEDURE — 85027 COMPLETE CBC AUTOMATED: CPT

## 2025-01-07 PROCEDURE — 80061 LIPID PANEL: CPT

## 2025-01-07 PROCEDURE — 84443 ASSAY THYROID STIM HORMONE: CPT

## 2025-01-07 PROCEDURE — 99396 PREV VISIT EST AGE 40-64: CPT | Performed by: FAMILY MEDICINE

## 2025-01-07 PROCEDURE — 80053 COMPREHEN METABOLIC PANEL: CPT

## 2025-01-07 PROCEDURE — 3008F BODY MASS INDEX DOCD: CPT | Performed by: FAMILY MEDICINE

## 2025-01-07 RX ORDER — BUDESONIDE AND FORMOTEROL FUMARATE DIHYDRATE 160; 4.5 UG/1; UG/1
AEROSOL RESPIRATORY (INHALATION)
COMMUNITY
Start: 2025-01-03

## 2025-01-07 ASSESSMENT — ENCOUNTER SYMPTOMS
DEPRESSION: 0
LOSS OF SENSATION IN FEET: 0
OCCASIONAL FEELINGS OF UNSTEADINESS: 0

## 2025-01-07 ASSESSMENT — PATIENT HEALTH QUESTIONNAIRE - PHQ9
2. FEELING DOWN, DEPRESSED OR HOPELESS: NOT AT ALL
SUM OF ALL RESPONSES TO PHQ9 QUESTIONS 1 AND 2: 0
1. LITTLE INTEREST OR PLEASURE IN DOING THINGS: NOT AT ALL

## 2025-01-07 NOTE — PROGRESS NOTES
Reason for Visit: Annual Physical Exam    HPI:    Presents to establish care  Overall in good health  Has dealt with a broken ankle with difficult healing which has limited her activity and ability to exercises. Recently was doing versa climber and injured her left shoulder.  Does improve with rest.    Sees a specialist for anxiety and well managed on Cymbalta.    Reactive airway at times, likes to keep her inhaler around.  Had to use steroid inhaler recently.    Sees GYN and up to date.  Active Problem List  Patient Active Problem List   Diagnosis    Impingement of left ankle joint    Osteochondral defect of talus    Peroneal tendinitis of left lower extremity    Ankylosis of subtalar joint    Anxiety    Chronic posterior ethmoidal sinusitis    Cubital tunnel syndrome    Deviated nasal septum    Dysfunction of eustachian tube    Hallux valgus with bunions    Hypothyroidism    Perera's neuroma of right foot    Reactive airway disease ([facility])    Ankle pain    Pain of foot    Arthralgia of hip    Fracture of calcaneus    Seasonal allergic rhinitis    Health maintenance examination       Comprehensive Medical/Surgical/Social/Family History  Past Medical History:   Diagnosis Date    Allergic 1986    Anxiety     Asthma     Hypothyroidism     Migraine     Personal history of other diseases of the respiratory system 2017    History of asthma     Past Surgical History:   Procedure Laterality Date    BREAST SURGERY Bilateral     Breast Reduction and lift 2021     SECTION, LOW TRANSVERSE  08, 10/20/10    HERNIA REPAIR  1981     Social History     Tobacco Use    Smoking status: Some Days     Types: Cigarettes    Smokeless tobacco: Never   Substance Use Topics    Alcohol use: Yes     Alcohol/week: 2.0 standard drinks of alcohol     Types: 2 Standard drinks or equivalent per week     Comment: Occasional    Drug use: Yes     Frequency: 0.3 times per week     Types: Marijuana     Comment: Minimal-gummy or  vape pen     Family History   Problem Relation Name Age of Onset    Asthma Sister Nita          Allergies and Medications  Doxycycline and Levofloxacin  Current Outpatient Medications on File Prior to Visit   Medication Sig Dispense Refill    ALBUTEROL INHL Inhale if needed.      ALPRAZolam (Xanax) 0.25 mg tablet 1 tablet (0.25 mg). (Patient taking differently: Take 1 tablet (0.25 mg) by mouth if needed for anxiety (For flying).)      ALPRAZolam (Xanax) 0.5 mg tablet TAKE 1/2 TO 1 TABLET BY MOUTH ONCE DAILY AS NEEDED FOR ANXIETY RELATED TO FLYING      BIOTIN, BULK, MISC       budesonide-formoteroL (Symbicort) 160-4.5 mcg/actuation inhaler       DULoxetine (Cymbalta) 20 mg DR capsule Take 2 capsules (40 mg) by mouth early in the morning..      DULoxetine (Cymbalta) 60 mg DR capsule       DULoxetine 40 mg DR capsule Take 50 mg by mouth once daily.      flunisolide (Nasalide) 25 mcg (0.025 %) spray,non-aerosol Administer 2 sprays into affected nostril(s) twice a day.      fluticasone (Flovent) 44 mcg/actuation inhaler Inhale 1 puff twice a day.      levalbuterol (Xopenex) 45 mcg/actuation inhaler Inhale 2 puffs every 4 hours if needed.      levothyroxine (Synthroid, Levoxyl) 50 mcg tablet Take 1 tablet (50 mcg) by mouth once daily in the morning. Take before meals.      magnesium 250 mg tablet Take 1 tablet (250 mg) by mouth once daily.      melatonin 3 mg tablet Take 1 tablet (3 mg) by mouth once daily at bedtime.      [DISCONTINUED] cefuroxime (Ceftin) 250 mg tablet Take 1 tablet (250 mg) by mouth once daily. (Patient not taking: Reported on 1/7/2025) 30 tablet 0    [DISCONTINUED] clobetasol (Temovate) 0.05 % cream APPLY EXTERNALLY TO THE AFFECTED AREAS TWICE DAILY ON MONDAY TO FRIDAY FOR 1 TO 2 WEEKS AS NEEDED . TAKE BREAKS INBETWEEN (Patient not taking: Reported on 1/7/2025)      [DISCONTINUED] dapsone (Aczone) 5 % gel Apply topically once daily. (Patient not taking: Reported on 1/7/2025) 60 g 0     "[DISCONTINUED] levalbuterol (Xopenex) 1.25 mg/0.5 mL nebulizer solution Inhale. (Patient not taking: Reported on 1/7/2025)       No current facility-administered medications on file prior to visit.         ROS otherwise negative aside from what was mentioned above in HPI.    Vitals  /80 (BP Location: Left arm, Patient Position: Sitting, BP Cuff Size: Adult)   Pulse 86   Temp 36.5 °C (97.7 °F) (Temporal)   Ht 1.575 m (5' 2\")   Wt 67.8 kg (149 lb 6.4 oz)   SpO2 98%   BMI 27.33 kg/m²   Body mass index is 27.33 kg/m².  Physical Exam  Gen: Alert, NAD  HEENT:  PERRL, EOMI, conjunctiva and sclera normal in appearance. External auditory canals/TMs normal; Oral cavity and posterior pharynx without lesions/exudate  Neck:  Supple with FROM; No masses/nodes palpable; Thyroid nontender and without nodules; No FAUSTO  Respiratory:  Lungs CTAB  Cardiovascular:  Heart RRR. No M/R/G. Peripheral pulses equal bilaterally  Extremities:  FROM all extremities; Muscle strength grossly normal with good tone  Neuro:  CN II-XII intact; Gross motor and sensory intact  Skin:  No suspicious lesions present    Assessment and Plan:  Problem List Items Addressed This Visit       Hypothyroidism    Overview     Last Assessment & Plan: Formatting of this note might be different from the original. Assessment: stable on Levothyroxine         Relevant Orders    TSH with reflex to Free T4 if abnormal    Health maintenance examination - Primary    Current Assessment & Plan     Recommended screening guidelines addressed and orders placed as indicated by age and chronic conditions  Screening labs ordered, will call with results  Mammogram/PAP through GYN  Continue to work on healthy lifestyle including well balanced diet, regular activity, limit alcohol, no tobacco products   Follow up annually           Relevant Orders    Comprehensive Metabolic Panel    CBC (Completed)    Lipid Panel         Dana Sellers MD    "

## 2025-01-07 NOTE — ASSESSMENT & PLAN NOTE
Recommended screening guidelines addressed and orders placed as indicated by age and chronic conditions  Screening labs ordered, will call with results  Mammogram/PAP through GYN  Continue to work on healthy lifestyle including well balanced diet, regular activity, limit alcohol, no tobacco products   Follow up annually

## 2025-01-08 LAB
ALBUMIN SERPL BCP-MCNC: 4.3 G/DL (ref 3.4–5)
ALP SERPL-CCNC: 52 U/L (ref 33–110)
ALT SERPL W P-5'-P-CCNC: 29 U/L (ref 7–45)
ANION GAP SERPL CALC-SCNC: 14 MMOL/L (ref 10–20)
AST SERPL W P-5'-P-CCNC: 26 U/L (ref 9–39)
BILIRUB SERPL-MCNC: 0.5 MG/DL (ref 0–1.2)
BUN SERPL-MCNC: 15 MG/DL (ref 6–23)
CALCIUM SERPL-MCNC: 9.3 MG/DL (ref 8.6–10.6)
CHLORIDE SERPL-SCNC: 105 MMOL/L (ref 98–107)
CHOLEST SERPL-MCNC: 226 MG/DL (ref 0–199)
CHOLESTEROL/HDL RATIO: 4.3
CO2 SERPL-SCNC: 24 MMOL/L (ref 21–32)
CREAT SERPL-MCNC: 0.88 MG/DL (ref 0.5–1.05)
EGFRCR SERPLBLD CKD-EPI 2021: 84 ML/MIN/1.73M*2
GLUCOSE SERPL-MCNC: 94 MG/DL (ref 74–99)
HDLC SERPL-MCNC: 52.7 MG/DL
LDLC SERPL CALC-MCNC: 144 MG/DL
NON HDL CHOLESTEROL: 173 MG/DL (ref 0–149)
POTASSIUM SERPL-SCNC: 4.5 MMOL/L (ref 3.5–5.3)
PROT SERPL-MCNC: 6.7 G/DL (ref 6.4–8.2)
SODIUM SERPL-SCNC: 138 MMOL/L (ref 136–145)
TRIGL SERPL-MCNC: 147 MG/DL (ref 0–149)
TSH SERPL-ACNC: 2.96 MIU/L (ref 0.44–3.98)
VLDL: 29 MG/DL (ref 0–40)

## 2025-01-11 ENCOUNTER — TELEMEDICINE (OUTPATIENT)
Dept: PRIMARY CARE | Facility: CLINIC | Age: 45
End: 2025-01-11
Payer: COMMERCIAL

## 2025-01-11 DIAGNOSIS — J32.9 RHINOSINUSITIS: Primary | ICD-10-CM

## 2025-01-11 PROCEDURE — 99213 OFFICE O/P EST LOW 20 MIN: CPT | Performed by: NURSE PRACTITIONER

## 2025-01-11 RX ORDER — AMOXICILLIN AND CLAVULANATE POTASSIUM 875; 125 MG/1; MG/1
1 TABLET, FILM COATED ORAL 2 TIMES DAILY
Qty: 10 TABLET | Refills: 0 | Status: SHIPPED | OUTPATIENT
Start: 2025-01-11 | End: 2025-01-16

## 2025-01-11 ASSESSMENT — ENCOUNTER SYMPTOMS
VOICE CHANGE: 0
SINUS PAIN: 1
SORE THROAT: 0
CHILLS: 0
NAUSEA: 0
COUGH: 1
HEADACHES: 1
APPETITE CHANGE: 0
ACTIVITY CHANGE: 0
WHEEZING: 0
SINUS PRESSURE: 1
VOMITING: 0
FATIGUE: 0
DIAPHORESIS: 0
SINUS COMPLAINT: 1
LIGHT-HEADEDNESS: 0
SHORTNESS OF BREATH: 0
DIZZINESS: 0
FEVER: 0

## 2025-01-11 ASSESSMENT — LIFESTYLE VARIABLES: HISTORY_OF_SMOKING: I SMOKED IN THE PAST, BUT NOT REGULARLY

## 2025-01-11 NOTE — PATIENT INSTRUCTIONS
Pleasure meeting with you today!    Let me know if you need anything.     Please send me a MyChart message if you have any questions or concerns.  FOR NON URGENT questions only.  Allow up to 72 hours for response.     If you have prescription issues or other questions you can email   Willie Nguyễn,  Digital Health Coordinator, at   [email or web address]

## 2025-01-11 NOTE — PROGRESS NOTES
"Subjective   Patient ID: Sai Scanoln is a 44 y.o. female who presents for Sinus Problem.    Sx onset: \"about 3 weeks ago\"  Woke with chest congestion, treated with mucinex with pseudoephedrine    Then went to [place], was prescribed a steroid inhaler, has bene taking for about 1.5 weeks  Today Sx: sinus pressure, facial pain, HA (with cough), cough, drainage clear  OTC treatment: pseudoephedrine, Flonase  Denies fever, N/V      Sinus Problem  Associated symptoms include congestion, coughing, headaches and sinus pressure. Pertinent negatives include no chills, diaphoresis, ear pain, shortness of breath or sore throat.        Review of Systems   Constitutional:  Negative for activity change, appetite change, chills, diaphoresis, fatigue and fever.   HENT:  Positive for congestion, postnasal drip, sinus pressure and sinus pain. Negative for ear pain, sore throat and voice change.    Respiratory:  Positive for cough. Negative for shortness of breath and wheezing.    Cardiovascular:  Negative for chest pain.   Gastrointestinal:  Negative for nausea and vomiting.   Neurological:  Positive for headaches. Negative for dizziness and light-headedness.       Objective   There were no vitals taken for this visit.    Physical Exam  Constitutional:       General: She is not in acute distress.     Appearance: Normal appearance. She is not ill-appearing.      Comments: On Demand Virtual Visit Patient Consent     An interactive audio and video telecommunication system which permits real time communications between the patient (at the originating site) and provider (at the distant site) was utilized to provide this telehealth service.   Verbal consent was requested and obtained from Sai Scanlon (or parent if under 18) on this date, 03/27/24 for a telehealth visit.   I have verbally confirmed with Sai Scanlon (or parent if under 18) that they are physically located in the [facility] during this virtual visit.    I performed this visit " using realtime telehealth tools, including an audio/video OR telephone connection between the patient listed who was located in the [place] and myself, Mati Joseph CNP (licensed in the [facility]).  At the start of the visit, I introduced myself as Mati Joseph, Nurse practitioner and verified the patients name, , and current physical location.    If they were currently outside of the state of [place], the visit was ended and the patient was referred to alternative means for evaluation and treatment.   The patient was made aware of the limitations of the telehealth visit.  They will not be physically examined and all issues may not be appropriate for a telehealth visit.  If necessary, an in person referral will be made.       DISCLAIMER:   In preparing for this visit and writing this note, I reviewed previous electronic medical records (labs, imaging and medical charts) available.  Significant findings which helped in decision making are recorded in this encounter charting.     Pulmonary:      Effort: Pulmonary effort is normal.   Neurological:      Mental Status: She is alert and oriented to person, place, and time.         Assessment/Plan   Diagnoses and all orders for this visit:  Rhinosinusitis  -     amoxicillin-pot clavulanate (Augmentin) 875-125 mg tablet; Take 1 tablet by mouth 2 times a day for 5 days.     Drink plenty of fluids  Wash hands frequently   Nasal saline as needed for nasal congestion   May try Flonase for increased nasal swelling.  Instill 2 squirts each nostril once daily x 2 weeks.    Complete entire coarse of antibiotics  Follow up with PCP as needed  [facility]

## 2025-01-27 ENCOUNTER — TELEPHONE (OUTPATIENT)
Dept: PRIMARY CARE | Facility: CLINIC | Age: 45
End: 2025-01-27
Payer: COMMERCIAL

## 2025-01-27 DIAGNOSIS — E03.9 HYPOTHYROIDISM, UNSPECIFIED TYPE: Primary | ICD-10-CM

## 2025-01-27 NOTE — TELEPHONE ENCOUNTER
Rx Refill Request Telephone Encounter    Name:  Sai Scanlon  :  [id number]  Medication Name:    levothyroxine (Synthroid, Levoxyl) 50 mcg tablet   Specific Pharmacy location:  [facility]  Date of last appointment:  25  Date of next appointment:  26

## 2025-01-28 RX ORDER — LEVOTHYROXINE SODIUM 50 UG/1
50 TABLET ORAL
Qty: 90 TABLET | Refills: 3 | Status: SHIPPED | OUTPATIENT
Start: 2025-01-28

## 2025-03-17 ENCOUNTER — OFFICE VISIT (OUTPATIENT)
Dept: ORTHOPEDIC SURGERY | Facility: HOSPITAL | Age: 45
End: 2025-03-17
Payer: COMMERCIAL

## 2025-03-17 DIAGNOSIS — M25.312 DYSKINESIS OF LEFT SCAPULA: ICD-10-CM

## 2025-03-17 DIAGNOSIS — M77.11 LATERAL EPICONDYLITIS, RIGHT ELBOW: Primary | ICD-10-CM

## 2025-03-17 DIAGNOSIS — S46.812A STRAIN OF LEFT LEVATOR SCAPULAE MUSCLE, INITIAL ENCOUNTER: ICD-10-CM

## 2025-03-17 PROCEDURE — 99213 OFFICE O/P EST LOW 20 MIN: CPT | Performed by: ORTHOPAEDIC SURGERY

## 2025-03-17 PROCEDURE — 99214 OFFICE O/P EST MOD 30 MIN: CPT | Performed by: ORTHOPAEDIC SURGERY

## 2025-03-17 NOTE — PROGRESS NOTES
This is a pleasant 45 y.o. year old female who presents for fuv of left foot/ankle.  She also comesi in with new issue on right elbow and left shoulder blade.  She has been doing walks with her dog, hikes with uneven terrain, and versaclimber without issue wrt her left foot and ankle.  When she was doing versaclimber a lot she notes pain in right elbow lateral aspect and pain along shoulder blade muscles on left.      PHYSICAL EXAMINATION  Constitutional Exam: patient's height and weight reviewed, well-kempt  Psychiatric Exam: alert and oriented x 3, appropriate mood and behavior  Eye Exam: CHAPIN, EOMI  Pulmonary Exam: breathing non-labored, no apparent distress  Lymphatic exam: no appreciable lymphadenopathy in the lower extremities  Cardiovascular exam: DP pulses 2+ bilaterally, PT 2+ bilaterally, toes are pink with good capillary refill, no pitting edema  Skin exam: no open lesions, rashes, abrasions or ulcerations  Neurological exam: sensation to light touch intact in both lower extremities in peripheral and dermatomal distributions (except for any abnormalities noted in musculoskeletal exam)    Musculoskeletal exam: left foot and ankle: loss of fat pad in sinus tarsi, AAFD stable, full ROM of ST joint with no pain on palpation and no effusion, full ankle ROM, stable ligamentous exam, no bony tenderness, achilles flexible    Right elbow: full ROM, sore over lateral epicondylar area at extensor attachment    Left shoulder: scapular dyskinesia with tight pectoralis, full ROM, stable, sore over levator and rhomboids    ASSESSMENT: 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  Use bandit brace for right elbow (has one at home but has to find it).  She would like to  get back to running and we gave her an example protocol- recommend that she progress slowly with walk-jog first.  Discussed use of injection into sinus tarsi- no pain today and so would hold off.  FUV in 6-12 months or as needed.  Showed her initial therapeutic exercises for right elbow and left scapular dyskinesia (pec stretching etc).  Use voltaren gel prn.  The patient's questions were answered in detail.      Note dictated with Xunlei software, completed without full type editing to avoid delay.

## 2025-03-27 ENCOUNTER — DOCUMENTATION (OUTPATIENT)
Dept: PHYSICAL THERAPY | Facility: CLINIC | Age: 45
End: 2025-03-27
Payer: COMMERCIAL

## 2025-03-27 NOTE — PROGRESS NOTES
Physical Therapy    Discharge Summary    Name: Sai Scanlon  MRN: [id number]  : 1980  Date: 25    Discharge Summary: PT    Discharge Information: Date of discharge 3-27-25 and Date of last visit 24    Therapy Summary:  Pt has been discharged from Physical Therapy Services. Please see last treatment note for details.

## 2025-03-28 ENCOUNTER — EVALUATION (OUTPATIENT)
Dept: OCCUPATIONAL THERAPY | Facility: CLINIC | Age: 45
End: 2025-03-28
Payer: COMMERCIAL

## 2025-03-28 DIAGNOSIS — M25.312 DYSKINESIS OF LEFT SCAPULA: ICD-10-CM

## 2025-03-28 DIAGNOSIS — S46.812A STRAIN OF LEFT LEVATOR SCAPULAE MUSCLE, INITIAL ENCOUNTER: ICD-10-CM

## 2025-03-28 DIAGNOSIS — M77.11 LATERAL EPICONDYLITIS, RIGHT ELBOW: ICD-10-CM

## 2025-03-28 DIAGNOSIS — M25.512 LEFT SHOULDER PAIN: ICD-10-CM

## 2025-03-28 DIAGNOSIS — M25.521 ELBOW PAIN, RIGHT: Primary | ICD-10-CM

## 2025-03-28 PROCEDURE — 97110 THERAPEUTIC EXERCISES: CPT | Mod: GO

## 2025-03-28 PROCEDURE — 97165 OT EVAL LOW COMPLEX 30 MIN: CPT | Mod: GO

## 2025-03-28 ASSESSMENT — ENCOUNTER SYMPTOMS
LOSS OF SENSATION IN FEET: 0
DEPRESSION: 0
OCCASIONAL FEELINGS OF UNSTEADINESS: 0

## 2025-03-28 ASSESSMENT — PAIN SCALES - GENERAL: PAINLEVEL_OUTOF10: 3

## 2025-03-28 ASSESSMENT — PAIN - FUNCTIONAL ASSESSMENT: PAIN_FUNCTIONAL_ASSESSMENT: 0-10

## 2025-03-28 NOTE — PROGRESS NOTES
Occupational Therapy    Occupational Therapy Evaluation    Name: Sai Scanlon  MRN: [id number]  : 1980  Date: 25      Time Calculation  Start Time: 0930  Stop Time: 1030  Time Calculation (min): 60 min  OT Evaluation Time Entry  OT Evaluation (Low) Time Entry: 37     OT Therapeutic Procedures Time Entry  Therapeutic Exercise Time Entry: 23              Visit: 1  Medical [place]  20V  Problem List Items Addressed This Visit             ICD-10-CM    Elbow pain, right - Primary M25.521    Relevant Orders    Follow Up In Occupational Therapy    Strain of left levator scapulae muscle S46.812A    Relevant Orders    Follow Up In Occupational Therapy    Dyskinesis of left scapula M25.312    Relevant Orders    Follow Up In Occupational Therapy    Lateral epicondylitis, right elbow M77.11    Relevant Orders    Follow Up In Occupational Therapy    Left shoulder pain M25.512    Relevant Orders    Follow Up In Occupational Therapy       Assessment:   Patient is a 45 year old female who came to outpatient OT with R elbow pain and left shoulder pain impacting patient's abilities to carry out daily tasks. Patient would benefit from skilled OT to address the above impairments to maximize patient's functional abilities.     Plan:   OT POC to include: heat, ultrasound, manual therapy, therapeutic exercise, therapeutic activity, HEP    1x  a week for 6-8 weeks  Rehab potential: Good  Patient in agreement with plan     Subjective   Patient agreeable to OT evaluation. Reports R elbow pain began in January.   Left shoulder started in 2024, which patient contributes to using versaclimber. Reports occasional discomfort with cooking/baking using the RUE.  Uses a R elbow brace.   Has also gotten dry needling done to the R elbow in the past with limited relief.               Current Problem:  1. Elbow pain, right  Follow Up In Occupational Therapy      2. Lateral epicondylitis, right elbow  Referral to Physical Therapy     Follow Up In Occupational Therapy      3. Dyskinesis of left scapula  Referral to Physical Therapy    Follow Up In Occupational Therapy      4. Strain of left levator scapulae muscle, initial encounter  Referral to Physical Therapy    Follow Up In Occupational Therapy      5. Left shoulder pain  Follow Up In Occupational Therapy        General:      General  Reason for Referral: OT eval and treat  Referred By:  (Emma Gu MD)  Preferred Learning Style: verbal, visual, written  Per Dr. Gu's note on 3/17/25:  “This is a pleasant 45 y.o. year old female who presents for fuv of left foot/ankle.  She also comesi in with new issue on right elbow and left shoulder blade.  She has been doing walks with her dog, hikes with uneven terrain, and versaclimber without issue wrt her left foot and ankle.  When she was doing versaclimber a lot she notes pain in right elbow lateral aspect and pain along shoulder blade muscles on left.      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  Use bandit brace for right elbow (has one at home but has to find it).  She would like to get back to running and we gave her an example protocol- recommend that she progress slowly with walk-jog first.  Discussed use of injection into sinus tarsi- no pain today and so would hold off.  FUV in 6-12 months or as needed.  Showed her  initial therapeutic exercises for right elbow and left scapular dyskinesia (pec stretching etc).  Use voltaren gel prn.  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  Prior Level of Function:  Independent   Roles/Routines:  Stay at home mom; cleans  Patient Goal:  \"No more pain, back to exercises.\"  Personal Factors that my impact Care:   Going on for about 2-3 months   Objective   Observation:  R handed  Palpation:  Slightly tender to palpate L subscapularis  ROM  RUE:   ROM WNL  LUE:   ROM WNL    Strength:  RUE:   Grossly 5/5   Wrist extension: 4+/5  pain with testing   :  20#  LUE:   Grossly 5/5   :  60#  Coordination:  Intact  Sensation:  Denies parathesias   Outcome Measures:  OT Adult Other Outcome Measures  Other Outcome Measures: 32 (Quick Dash:)  47.73%    OP EDUCATION:/Treatment:   Educated on being more conscious of picking up things with palms up.  Educated on and completed R elbow massage, R elbow stretches, eccentric loading to R elbow tendon with 1# hand weight, and pec stretches 60, 90, 120 degrees for the L shoulder. All given in handout form    Goals:  Active       OT Goals       HEP       Start:  03/28/25    Expected End:  05/27/25       Patient will be independent with established HEP to maximize R elbow and L shoulder functional mobility          Pain       Start:  03/28/25    Expected End:  06/06/25       Patient will report a 3/10 or less pain in the R elbow during functional tasks         Function       Start:  03/28/25    Expected End:  06/06/25       Patient will demonstrate an increase in BUE function as evident by having at least a 10% decrease on Quick Dash.               "

## 2025-04-03 ENCOUNTER — TREATMENT (OUTPATIENT)
Dept: OCCUPATIONAL THERAPY | Facility: CLINIC | Age: 45
End: 2025-04-03
Payer: COMMERCIAL

## 2025-04-03 DIAGNOSIS — S46.812A STRAIN OF LEFT LEVATOR SCAPULAE MUSCLE, INITIAL ENCOUNTER: ICD-10-CM

## 2025-04-03 DIAGNOSIS — M77.11 LATERAL EPICONDYLITIS, RIGHT ELBOW: ICD-10-CM

## 2025-04-03 DIAGNOSIS — M25.512 LEFT SHOULDER PAIN: ICD-10-CM

## 2025-04-03 DIAGNOSIS — M25.521 ELBOW PAIN, RIGHT: Primary | ICD-10-CM

## 2025-04-03 DIAGNOSIS — M25.312 DYSKINESIS OF LEFT SCAPULA: ICD-10-CM

## 2025-04-03 PROCEDURE — 97140 MANUAL THERAPY 1/> REGIONS: CPT | Mod: GO

## 2025-04-03 PROCEDURE — 97110 THERAPEUTIC EXERCISES: CPT | Mod: GO

## 2025-04-03 PROCEDURE — 97035 APP MDLTY 1+ULTRASOUND EA 15: CPT | Mod: GO

## 2025-04-03 ASSESSMENT — PAIN SCALES - GENERAL: PAINLEVEL_OUTOF10: 4

## 2025-04-03 ASSESSMENT — PAIN - FUNCTIONAL ASSESSMENT: PAIN_FUNCTIONAL_ASSESSMENT: 0-10

## 2025-04-03 NOTE — PROGRESS NOTES
"Occupational Therapy    Occupational Therapy Treatment    Name: Sai Scanlon  MRN: [id number]  : 1980  Date: 25      Time Calculation  Start Time: 0915  Stop Time: 1000  Time Calculation (min): 45 min     OT Modalities Time Entry  Ultrasound Time Entry: 8  OT Therapeutic Procedures Time Entry  Manual Therapy Time Entry: 15  Therapeutic Exercise Time Entry: 15              Visit: 2  Medical [place]  20V  Problem List Items Addressed This Visit             ICD-10-CM    Elbow pain, right - Primary M25.521    Strain of left levator scapulae muscle S46.812A    Dyskinesis of left scapula M25.312    Lateral epicondylitis, right elbow M77.11    Left shoulder pain M25.512       Assessment:   Patient responded well to treatment session, able to complete eccentric loading exercises with less discomfort today. By end of session patient rated R elbow pain at a 3/10.  Added in scapular strengthening today.    Plan:   Continue with skilled OT POC     Subjective   Patient agreeable to treatment session. \"I had to make some macaroons last night so that was irritating to the R elbow.\"  Precautions:   STEADI: 2  Pain Assessment:  Pain Assessment  Pain Assessment: 0-10  0-10 (Numeric) Pain Score: 4    Objective    Unbillable time: 5 mins  Heat applied to the R elbow before exercises. Skin checks before and after application.    Manual Therapy: 15 mins  STM over lateral aspect of R elbow  Skin rolling to lateral aspect of R elbow  Passive R forearm stretches    Ultrasound: 8 mins:  x8min continuous 3MHZ @1.0w/cm2 over lateral aspect of R elbow    Therapeutic Exericse: 15 mins  -Eccentric loading to the R wrist extensors with 1# hand weight 2 sets of 10  -R wrist flexion strengthening with 1# hand weight 2 sets of 10  -Scapular stabilizer strengthening in prone 2 sets of 10 each: shoulder extension, “T” and “W” scapular retraction    OP EDUCATION:   Continue with HEP  -Added in scapular strengthening exercises -given in handout " form     Goals:  Active       OT Goals       HEP       Start:  03/28/25    Expected End:  05/27/25       Patient will be independent with established HEP to maximize R elbow and L shoulder functional mobility          Pain       Start:  03/28/25    Expected End:  06/06/25       Patient will report a 3/10 or less pain in the R elbow during functional tasks         Function       Start:  03/28/25    Expected End:  06/06/25       Patient will demonstrate an increase in BUE function as evident by having at least a 10% decrease on Quick Dash.                      See above

## 2025-04-09 ENCOUNTER — TREATMENT (OUTPATIENT)
Dept: OCCUPATIONAL THERAPY | Facility: CLINIC | Age: 45
End: 2025-04-09
Payer: COMMERCIAL

## 2025-04-09 DIAGNOSIS — M77.11 LATERAL EPICONDYLITIS, RIGHT ELBOW: ICD-10-CM

## 2025-04-09 DIAGNOSIS — M25.512 LEFT SHOULDER PAIN: ICD-10-CM

## 2025-04-09 DIAGNOSIS — M25.521 ELBOW PAIN, RIGHT: Primary | ICD-10-CM

## 2025-04-09 DIAGNOSIS — S46.812A STRAIN OF LEFT LEVATOR SCAPULAE MUSCLE, INITIAL ENCOUNTER: ICD-10-CM

## 2025-04-09 DIAGNOSIS — M25.312 DYSKINESIS OF LEFT SCAPULA: ICD-10-CM

## 2025-04-09 PROCEDURE — 97110 THERAPEUTIC EXERCISES: CPT | Mod: GO

## 2025-04-09 PROCEDURE — 97140 MANUAL THERAPY 1/> REGIONS: CPT | Mod: GO

## 2025-04-09 PROCEDURE — 97035 APP MDLTY 1+ULTRASOUND EA 15: CPT | Mod: GO

## 2025-04-09 ASSESSMENT — PAIN SCALES - GENERAL: PAINLEVEL_OUTOF10: 3

## 2025-04-09 ASSESSMENT — PAIN - FUNCTIONAL ASSESSMENT: PAIN_FUNCTIONAL_ASSESSMENT: 0-10

## 2025-04-09 NOTE — PROGRESS NOTES
Occupational Therapy    Occupational Therapy Treatment    Name: Sai Scanlon  MRN: [id number]  : 1980  Date: 25      Time Calculation  Start Time: 0845  Stop Time: 930  Time Calculation (min): 45 min     OT Modalities Time Entry  Ultrasound Time Entry: 8  OT Therapeutic Procedures Time Entry  Manual Therapy Time Entry: 15  Therapeutic Exercise Time Entry: 15              Visit: 3  [facility] [place]  20V  Problem List Items Addressed This Visit             ICD-10-CM    Elbow pain, right - Primary M25.521    Strain of left levator scapulae muscle S46.812A    Dyskinesis of left scapula M25.312    Lateral epicondylitis, right elbow M77.11    Left shoulder pain M25.512       Assessment:   Patient responded well to treatment session. Patient able to use a 2# hand weight today for wrist exercises.  I again stressed the importance of doing all the exercises every day, including the scapular strengthening to maximize benefit.    Plan:   Continue with skilled OT POC     Subjective   Patient agreeable to treatment session. Patient got dry needling done of the R elbow which patient reported has been helpful. Patient reports has not done much of the exercises except stretching.    Precautions:   STEADI: 2  Pain Assessment:  Pain Assessment  Pain Assessment: 0-10  0-10 (Numeric) Pain Score: 3    Objective    Unbillable time: 5 mins  Heat applied to the R elbow before exercises. Skin checks before and after application.    Manual Therapy: 15 mins  STM over lateral aspect of R elbow  Skin rolling to lateral aspect of R elbow  Passive R forearm stretches    Ultrasound: 8 mins:  x8min continuous 3MHZ @1.0w/cm2 over lateral aspect of R elbow    Therapeutic Exericse: 15 mins  -Eccentric loading to the R wrist extensors with 2# hand weight 2 sets of 10  -R wrist flexion strengthening with 2# hand weight 2 sets of 10  -Scapular stabilizer strengthening in prone 3 sets of 10 each: shoulder extension, “T” and “W” scapular  retraction    OP EDUCATION:   Continue with HEP    Goals:  Active       OT Goals       HEP       Start:  03/28/25    Expected End:  05/27/25       Patient will be independent with established HEP to maximize R elbow and L shoulder functional mobility          Pain       Start:  03/28/25    Expected End:  06/06/25       Patient will report a 3/10 or less pain in the R elbow during functional tasks         Function       Start:  03/28/25    Expected End:  06/06/25       Patient will demonstrate an increase in BUE function as evident by having at least a 10% decrease on Quick Dash.

## 2025-04-22 ENCOUNTER — APPOINTMENT (OUTPATIENT)
Dept: PHYSICAL THERAPY | Facility: CLINIC | Age: 45
End: 2025-04-22
Payer: COMMERCIAL

## 2025-04-22 ENCOUNTER — TREATMENT (OUTPATIENT)
Dept: OCCUPATIONAL THERAPY | Facility: CLINIC | Age: 45
End: 2025-04-22
Payer: COMMERCIAL

## 2025-04-22 DIAGNOSIS — M25.521 ELBOW PAIN, RIGHT: Primary | ICD-10-CM

## 2025-04-22 DIAGNOSIS — M77.11 LATERAL EPICONDYLITIS, RIGHT ELBOW: ICD-10-CM

## 2025-04-22 DIAGNOSIS — M25.312 DYSKINESIS OF LEFT SCAPULA: ICD-10-CM

## 2025-04-22 DIAGNOSIS — S46.812A STRAIN OF LEFT LEVATOR SCAPULAE MUSCLE, INITIAL ENCOUNTER: ICD-10-CM

## 2025-04-22 DIAGNOSIS — M25.512 LEFT SHOULDER PAIN: ICD-10-CM

## 2025-04-22 PROCEDURE — 97140 MANUAL THERAPY 1/> REGIONS: CPT | Mod: GO

## 2025-04-22 PROCEDURE — 97035 APP MDLTY 1+ULTRASOUND EA 15: CPT | Mod: GO

## 2025-04-22 PROCEDURE — 97110 THERAPEUTIC EXERCISES: CPT | Mod: GO

## 2025-04-22 ASSESSMENT — PAIN SCALES - GENERAL: PAINLEVEL_OUTOF10: 2

## 2025-04-22 ASSESSMENT — PAIN - FUNCTIONAL ASSESSMENT: PAIN_FUNCTIONAL_ASSESSMENT: 0-10

## 2025-04-22 NOTE — PROGRESS NOTES
Occupational Therapy    Occupational Therapy Treatment    Name: Sai Scanlon  MRN: [id number]  : 1980  Date: 25      Time Calculation  Start Time: 0900  Stop Time: 0945  Time Calculation (min): 45 min     OT Modalities Time Entry  Ultrasound Time Entry: 8  OT Therapeutic Procedures Time Entry  Manual Therapy Time Entry: 15  Therapeutic Exercise Time Entry: 15              Visit: 4  [facility] [place]  20V  Problem List Items Addressed This Visit           ICD-10-CM    Elbow pain, right - Primary M25.521    Strain of left levator scapulae muscle S46.812A    Dyskinesis of left scapula M25.312    Lateral epicondylitis, right elbow M77.11    Left shoulder pain M25.512       Assessment:   Patient responded well to treatment session. Patient still reports soreness in the lateral aspect of the R elbow, however no sharp pain. Patient reports not having L shoulder pain at this time.    Plan:   Continue with skilled OT POC. Will re-assess next esssion    Subjective   Patient agreeable to treatment session. Patient reports still has some soreness, however not having the sharp pain like before.  Patient was on vacation, and reported had not been doing her exercises.    Precautions:   STEADI: 2  Pain Assessment:  Pain Assessment  Pain Assessment: 0-10  0-10 (Numeric) Pain Score: 2    Objective    Unbillable time: 5 mins  Heat applied to the R elbow before exercises. Skin checks before and after application.    Manual Therapy: 15 mins  STM over lateral aspect of R elbow  Skin rolling to lateral aspect of R elbow  Passive R forearm stretches    Ultrasound: 8 mins:  x8min continuous 3MHZ @1.0w/cm2 over lateral aspect of R elbow    Therapeutic Exericse: 15 mins  -Eccentric loading to the R wrist extensors with 2# hand weight 3 sets of 10  -R wrist flexion strengthening with 3# hand weight 2 sets of 10  -Scapular stabilizer strengthening in prone 3 sets of 10 each: shoulder extension, “T” and “W” scapular retraction    OP  EDUCATION:   Continue with HEP    Goals:  Active       OT Goals       HEP       Start:  03/28/25    Expected End:  05/27/25       Patient will be independent with established HEP to maximize R elbow and L shoulder functional mobility          Pain       Start:  03/28/25    Expected End:  06/06/25       Patient will report a 3/10 or less pain in the R elbow during functional tasks         Function       Start:  03/28/25    Expected End:  06/06/25       Patient will demonstrate an increase in BUE function as evident by having at least a 10% decrease on Quick Dash.

## 2025-04-29 ENCOUNTER — TREATMENT (OUTPATIENT)
Dept: OCCUPATIONAL THERAPY | Facility: CLINIC | Age: 45
End: 2025-04-29
Payer: COMMERCIAL

## 2025-04-29 DIAGNOSIS — M25.312 DYSKINESIS OF LEFT SCAPULA: ICD-10-CM

## 2025-04-29 DIAGNOSIS — M25.512 LEFT SHOULDER PAIN: ICD-10-CM

## 2025-04-29 DIAGNOSIS — M25.521 ELBOW PAIN, RIGHT: Primary | ICD-10-CM

## 2025-04-29 DIAGNOSIS — M77.11 LATERAL EPICONDYLITIS, RIGHT ELBOW: ICD-10-CM

## 2025-04-29 DIAGNOSIS — S46.812A STRAIN OF LEFT LEVATOR SCAPULAE MUSCLE, INITIAL ENCOUNTER: ICD-10-CM

## 2025-04-29 PROCEDURE — 97140 MANUAL THERAPY 1/> REGIONS: CPT | Mod: GO

## 2025-04-29 PROCEDURE — 97035 APP MDLTY 1+ULTRASOUND EA 15: CPT | Mod: GO

## 2025-04-29 PROCEDURE — 97110 THERAPEUTIC EXERCISES: CPT | Mod: GO

## 2025-04-29 ASSESSMENT — PAIN SCALES - GENERAL: PAINLEVEL_OUTOF10: 2

## 2025-04-29 ASSESSMENT — PAIN - FUNCTIONAL ASSESSMENT: PAIN_FUNCTIONAL_ASSESSMENT: 0-10

## 2025-04-29 NOTE — PROGRESS NOTES
Occupational Therapy    Occupational Therapy Treatment    Name: Sai Scanlon  MRN: [id number]  : 1980  Date: 25      Time Calculation  Start Time: 0855  Stop Time: 0940  Time Calculation (min): 45 min     OT Modalities Time Entry  Ultrasound Time Entry: 8  OT Therapeutic Procedures Time Entry  Manual Therapy Time Entry: 15  Therapeutic Exercise Time Entry: 15              Visit: 5  [facility] [place]  20V  Problem List Items Addressed This Visit           ICD-10-CM    Elbow pain, right - Primary M25.521    Strain of left levator scapulae muscle S46.812A    Dyskinesis of left scapula M25.312    Lateral epicondylitis, right elbow M77.11    Left shoulder pain M25.512       Assessment:   Patient responded well to treatment session. Patient reports a more consistent 2/10 in the R elbow. Patient's R  strength has improved and Quick Dash score has also improved (see objective section.) We added a couple more exercises today to work up the kinetic chain. Patient will follow up in two weeks.    Plan:   Continue with skilled OT POC. Patient will follow up in two weeks.    Subjective   Patient agreeable to treatment session. Patient reports has been more consistent with her exercises.     Precautions:   STEADI: 2  Pain Assessment:  Pain Assessment  Pain Assessment: 0-10  0-10 (Numeric) Pain Score: 2    Objective      25:  Quick Dash:  28 /   38.64%  R : 39#    Unbillable time: 5 mins  Heat applied to the R elbow before exercises. Skin checks before and after application.    Manual Therapy: 15 mins  STM over lateral aspect of R elbow  Skin rolling to lateral aspect of R elbow  Passive R forearm stretches  -Facilitation of forearm supination with gentle facilitation at L radial head, 2 sets of 10    Ultrasound: 8 mins:  x8min continuous 3MHZ @1.0w/cm2 over lateral aspect of R elbow    Therapeutic Exericse: 15 mins  -Eccentric loading to the R wrist extensors with 2# hand weight 3 sets of 10  -R wrist flexion,  supination, elbow flexion with supination strengthening with 3# hand weight 2 sets of 10 each  -Scapular stabilizer strengthening in prone 3 sets of 10 each: shoulder extension, “T” and “W” scapular retraction    OP EDUCATION:   Continue with HEP    Goals:  Active       OT Goals       HEP       Start:  03/28/25    Expected End:  05/27/25       Patient will be independent with established HEP to maximize R elbow and L shoulder functional mobility          Pain       Start:  03/28/25    Expected End:  06/06/25       Patient will report a 3/10 or less pain in the R elbow during functional tasks         Function       Start:  03/28/25    Expected End:  06/06/25       Patient will demonstrate an increase in BUE function as evident by having at least a 10% decrease on Quick Dash.

## 2025-05-13 ENCOUNTER — TREATMENT (OUTPATIENT)
Dept: OCCUPATIONAL THERAPY | Facility: CLINIC | Age: 45
End: 2025-05-13
Payer: COMMERCIAL

## 2025-05-13 DIAGNOSIS — M77.11 LATERAL EPICONDYLITIS, RIGHT ELBOW: ICD-10-CM

## 2025-05-13 DIAGNOSIS — S46.812A STRAIN OF LEFT LEVATOR SCAPULAE MUSCLE, INITIAL ENCOUNTER: ICD-10-CM

## 2025-05-13 DIAGNOSIS — M25.312 DYSKINESIS OF LEFT SCAPULA: ICD-10-CM

## 2025-05-13 DIAGNOSIS — M25.521 ELBOW PAIN, RIGHT: Primary | ICD-10-CM

## 2025-05-13 DIAGNOSIS — M25.512 LEFT SHOULDER PAIN: ICD-10-CM

## 2025-05-13 PROCEDURE — 97110 THERAPEUTIC EXERCISES: CPT | Mod: GO

## 2025-05-13 PROCEDURE — 97140 MANUAL THERAPY 1/> REGIONS: CPT | Mod: GO

## 2025-05-13 ASSESSMENT — PAIN - FUNCTIONAL ASSESSMENT: PAIN_FUNCTIONAL_ASSESSMENT: 0-10

## 2025-05-13 ASSESSMENT — PAIN SCALES - GENERAL: PAINLEVEL_OUTOF10: 1

## 2025-05-13 NOTE — PROGRESS NOTES
"Occupational Therapy    Occupational Therapy Treatment    Name: Sai Scanlon  MRN: [id number]  : 1980  Date: 25      Time Calculation  Start Time: 0835  Stop Time: 0910  Time Calculation (min): 35 min        OT Therapeutic Procedures Time Entry  Manual Therapy Time Entry: 15  Therapeutic Exercise Time Entry: 15              Visit: 6  [facility] [place]  20V  Problem List Items Addressed This Visit           ICD-10-CM    Elbow pain, right - Primary M25.521    Strain of left levator scapulae muscle S46.812A    Dyskinesis of left scapula M25.312    Lateral epicondylitis, right elbow M77.11    Left shoulder pain M25.512       Assessment:   Patient has responded well to course of OT. Patient reports a more consistent 1-2/10 in the R elbow. Patient's R  strength has improved, and Quick Dash score has also improved (see objective section.) Patient in agreement to continue with HEP after discharge.    Plan:   Goals met. Discharge. Continue with HEP.  Patient in agreement with plan.    Subjective   Patient agreeable to treatment session. \"My elbow pain has gotten a lot better.\" \"The exercises are helping.\"    Precautions:   STEADI: 2  Pain Assessment:  Pain Assessment  Pain Assessment: 0-10  0-10 (Numeric) Pain Score: 1    Objective      25:  Quick Dash:  19/ 18.18%  R : 40#    Unbillable time: 5 mins  Heat applied to the R elbow before exercises. Skin checks before and after application.    Manual Therapy: 15 mins  STM over lateral aspect of R elbow  Skin rolling to lateral aspect of R elbow  Passive R forearm stretches  -Facilitation of forearm supination with gentle facilitation at L radial head, 2 sets of 10    Therapeutic Exericse: 15 mins  -Eccentric loading to the R wrist extensors with 2# hand weight 3 sets of 10  -R wrist flexion, supination, elbow flexion with supination strengthening with 3# hand weight 2 sets of 10 each  -Scapular stabilizer strengthening in prone 3 sets of 10 each: shoulder " extension, “T” and “W” scapular retraction    OP EDUCATION:   Continue with HEP    Goals:  Resolved       OT Goals       HEP (Met)       Start:  03/28/25    Expected End:  05/27/25    Resolved:  05/13/25    Patient will be independent with established HEP to maximize R elbow and L shoulder functional mobility          Pain (Met)       Start:  03/28/25    Expected End:  06/06/25    Resolved:  05/13/25    Patient will report a 3/10 or less pain in the R elbow during functional tasks         Function (Met)       Start:  03/28/25    Expected End:  06/06/25    Resolved:  05/13/25    Patient will demonstrate an increase in BUE function as evident by having at least a 10% decrease on Quick Dash.

## 2026-01-07 ENCOUNTER — APPOINTMENT (OUTPATIENT)
Dept: PRIMARY CARE | Facility: CLINIC | Age: 46
End: 2026-01-07
Payer: COMMERCIAL

## (undated) DEVICE — GLOVE, SURGICAL, PROTEXIS PI ORTHO, 7.0, PF, LF

## (undated) DEVICE — DRAPE, TOWEL, STERI DRAPE, 17 X 11 IN, PLASTIC, STERILE

## (undated) DEVICE — SUTURE, ETHILON, 4-0, 18, PS2, BLACK

## (undated) DEVICE — Device

## (undated) DEVICE — TUBING, OUTFLOW, DRAIN PIPE, W/ONE WAY VALVE (FMS VUE)

## (undated) DEVICE — DRAPE, SHEET, U, STERI DRAPE, 47 X 51 IN, DISPOSABLE, STERILE

## (undated) DEVICE — BANDAGE, ESMARK, 6 IN X 9 FT, STERILE

## (undated) DEVICE — BANDAGE, ELASTIC, MATRIX, SELF-CLOSURE, 6 IN X 5 YD, LF

## (undated) DEVICE — PADDING, WEBRIL, UNDERCAST, STERILE, 4 IN

## (undated) DEVICE — PADDING, WEBRIL, UNDERCAST, STERILE, 6 IN

## (undated) DEVICE — STRAP, ANKLE, DISTRACTOR, ARTHROSCOPY, STERILE

## (undated) DEVICE — DRAPE, SHEET, FAN FOLDED, MEDIUM, 44 X 72 IN, DISPOSABLE, LF, STERILE

## (undated) DEVICE — COVER HANDLE LIGHT, STERIS, BLUE, STERILE

## (undated) DEVICE — BLADE, SHAVER, FULL RADIUS, 2.9MM

## (undated) DEVICE — PREP TRAY, VAGINAL

## (undated) DEVICE — BANDAGE, ELASTIC, MATRIX, SELF-CLOSURE, 4 IN X 5 YD, LF

## (undated) DEVICE — TUBING, NFLOW, FMS VUE, SNGL USE, DISP, LF

## (undated) DEVICE — DRESSING, ABDOMINAL, TENDERSORB, 8 X 10 IN, STERILE